# Patient Record
Sex: MALE | Race: OTHER | HISPANIC OR LATINO | URBAN - METROPOLITAN AREA
[De-identification: names, ages, dates, MRNs, and addresses within clinical notes are randomized per-mention and may not be internally consistent; named-entity substitution may affect disease eponyms.]

---

## 2017-05-22 ENCOUNTER — INPATIENT (INPATIENT)
Facility: HOSPITAL | Age: 32
LOS: 1 days | Discharge: HOME | DRG: 195 | End: 2017-05-24
Attending: INTERNAL MEDICINE | Admitting: INTERNAL MEDICINE
Payer: MEDICAID

## 2017-05-22 VITALS
WEIGHT: 139.99 LBS | OXYGEN SATURATION: 100 % | RESPIRATION RATE: 20 BRPM | HEART RATE: 140 BPM | DIASTOLIC BLOOD PRESSURE: 94 MMHG | HEIGHT: 70 IN | TEMPERATURE: 97 F | SYSTOLIC BLOOD PRESSURE: 130 MMHG

## 2017-05-22 DIAGNOSIS — J15.9 UNSPECIFIED BACTERIAL PNEUMONIA: ICD-10-CM

## 2017-05-22 DIAGNOSIS — R10.32 LEFT LOWER QUADRANT PAIN: ICD-10-CM

## 2017-05-22 DIAGNOSIS — J18.9 PNEUMONIA, UNSPECIFIED ORGANISM: ICD-10-CM

## 2017-05-22 DIAGNOSIS — E10.9 TYPE 1 DIABETES MELLITUS WITHOUT COMPLICATIONS: ICD-10-CM

## 2017-05-22 LAB
ALBUMIN SERPL ELPH-MCNC: 4.3 G/DL — SIGNIFICANT CHANGE UP (ref 3.3–5.2)
ALP SERPL-CCNC: 109 U/L — SIGNIFICANT CHANGE UP (ref 40–120)
ALT FLD-CCNC: 11 U/L — SIGNIFICANT CHANGE UP
ANION GAP SERPL CALC-SCNC: 14 MMOL/L — SIGNIFICANT CHANGE UP (ref 5–17)
ANION GAP SERPL CALC-SCNC: 18 MMOL/L — HIGH (ref 5–17)
AST SERPL-CCNC: 11 U/L — SIGNIFICANT CHANGE UP
BASOPHILS # BLD AUTO: 0 K/UL — SIGNIFICANT CHANGE UP (ref 0–0.2)
BASOPHILS NFR BLD AUTO: 0.1 % — SIGNIFICANT CHANGE UP (ref 0–2)
BILIRUB SERPL-MCNC: 1.1 MG/DL — SIGNIFICANT CHANGE UP (ref 0.4–2)
BUN SERPL-MCNC: 12 MG/DL — SIGNIFICANT CHANGE UP (ref 8–20)
BUN SERPL-MCNC: 14 MG/DL — SIGNIFICANT CHANGE UP (ref 8–20)
CALCIUM SERPL-MCNC: 9.2 MG/DL — SIGNIFICANT CHANGE UP (ref 8.6–10.2)
CALCIUM SERPL-MCNC: 9.8 MG/DL — SIGNIFICANT CHANGE UP (ref 8.6–10.2)
CHLORIDE SERPL-SCNC: 92 MMOL/L — LOW (ref 98–107)
CHLORIDE SERPL-SCNC: 94 MMOL/L — LOW (ref 98–107)
CO2 SERPL-SCNC: 22 MMOL/L — SIGNIFICANT CHANGE UP (ref 22–29)
CO2 SERPL-SCNC: 24 MMOL/L — SIGNIFICANT CHANGE UP (ref 22–29)
CREAT SERPL-MCNC: 0.42 MG/DL — LOW (ref 0.5–1.3)
CREAT SERPL-MCNC: 0.48 MG/DL — LOW (ref 0.5–1.3)
D DIMER BLD IA.RAPID-MCNC: 174 NG/ML DDU — SIGNIFICANT CHANGE UP
EOSINOPHIL # BLD AUTO: 0 K/UL — SIGNIFICANT CHANGE UP (ref 0–0.5)
EOSINOPHIL NFR BLD AUTO: 0.1 % — SIGNIFICANT CHANGE UP (ref 0–5)
GLUCOSE SERPL-MCNC: 301 MG/DL — HIGH (ref 70–115)
GLUCOSE SERPL-MCNC: 406 MG/DL — HIGH (ref 70–115)
HCT VFR BLD CALC: 35.5 % — LOW (ref 42–52)
HGB BLD-MCNC: 12.8 G/DL — LOW (ref 14–18)
LACTATE BLDV-MCNC: 1 MMOL/L — SIGNIFICANT CHANGE UP (ref 0.7–2)
LIDOCAIN IGE QN: 14 U/L — LOW (ref 22–51)
LYMPHOCYTES # BLD AUTO: 0.8 K/UL — LOW (ref 1–4.8)
LYMPHOCYTES # BLD AUTO: 8 % — LOW (ref 20–55)
MCHC RBC-ENTMCNC: 30.1 PG — SIGNIFICANT CHANGE UP (ref 27–31)
MCHC RBC-ENTMCNC: 36.1 G/DL — HIGH (ref 32–36)
MCV RBC AUTO: 83.5 FL — SIGNIFICANT CHANGE UP (ref 80–94)
MONOCYTES # BLD AUTO: 1 K/UL — HIGH (ref 0–0.8)
MONOCYTES NFR BLD AUTO: 10 % — SIGNIFICANT CHANGE UP (ref 3–10)
NEUTROPHILS # BLD AUTO: 8.8 K/UL — HIGH (ref 1.8–8)
NEUTROPHILS NFR BLD AUTO: 80 % — HIGH (ref 37–73)
PLATELET # BLD AUTO: 153 K/UL — SIGNIFICANT CHANGE UP (ref 150–400)
POTASSIUM SERPL-MCNC: 4.2 MMOL/L — SIGNIFICANT CHANGE UP (ref 3.5–5.3)
POTASSIUM SERPL-MCNC: 4.6 MMOL/L — SIGNIFICANT CHANGE UP (ref 3.5–5.3)
POTASSIUM SERPL-SCNC: 4.2 MMOL/L — SIGNIFICANT CHANGE UP (ref 3.5–5.3)
POTASSIUM SERPL-SCNC: 4.6 MMOL/L — SIGNIFICANT CHANGE UP (ref 3.5–5.3)
PROT SERPL-MCNC: 8.2 G/DL — SIGNIFICANT CHANGE UP (ref 6.6–8.7)
RAPID RVP RESULT: SIGNIFICANT CHANGE UP
RBC # BLD: 4.25 M/UL — LOW (ref 4.6–6.2)
RBC # FLD: 11.7 % — SIGNIFICANT CHANGE UP (ref 11–15.6)
SODIUM SERPL-SCNC: 132 MMOL/L — LOW (ref 135–145)
SODIUM SERPL-SCNC: 132 MMOL/L — LOW (ref 135–145)
WBC # BLD: 10.7 K/UL — SIGNIFICANT CHANGE UP (ref 4.8–10.8)
WBC # FLD AUTO: 10.7 K/UL — SIGNIFICANT CHANGE UP (ref 4.8–10.8)

## 2017-05-22 PROCEDURE — 74177 CT ABD & PELVIS W/CONTRAST: CPT | Mod: 26

## 2017-05-22 PROCEDURE — 71020: CPT | Mod: 26

## 2017-05-22 PROCEDURE — 72070 X-RAY EXAM THORAC SPINE 2VWS: CPT | Mod: 26

## 2017-05-22 PROCEDURE — 99285 EMERGENCY DEPT VISIT HI MDM: CPT | Mod: 25

## 2017-05-22 PROCEDURE — 93010 ELECTROCARDIOGRAM REPORT: CPT

## 2017-05-22 RX ORDER — SODIUM CHLORIDE 9 MG/ML
500 INJECTION INTRAMUSCULAR; INTRAVENOUS; SUBCUTANEOUS
Qty: 0 | Refills: 0 | Status: DISCONTINUED | OUTPATIENT
Start: 2017-05-22 | End: 2017-05-22

## 2017-05-22 RX ORDER — CEFTRIAXONE 500 MG/1
1 INJECTION, POWDER, FOR SOLUTION INTRAMUSCULAR; INTRAVENOUS ONCE
Qty: 0 | Refills: 0 | Status: COMPLETED | OUTPATIENT
Start: 2017-05-22 | End: 2017-05-22

## 2017-05-22 RX ORDER — DEXTROSE 50 % IN WATER 50 %
25 SYRINGE (ML) INTRAVENOUS ONCE
Qty: 0 | Refills: 0 | Status: DISCONTINUED | OUTPATIENT
Start: 2017-05-22 | End: 2017-05-24

## 2017-05-22 RX ORDER — KETOROLAC TROMETHAMINE 30 MG/ML
15 SYRINGE (ML) INJECTION ONCE
Qty: 0 | Refills: 0 | Status: DISCONTINUED | OUTPATIENT
Start: 2017-05-22 | End: 2017-05-22

## 2017-05-22 RX ORDER — KETOROLAC TROMETHAMINE 30 MG/ML
30 SYRINGE (ML) INJECTION EVERY 6 HOURS
Qty: 0 | Refills: 0 | Status: DISCONTINUED | OUTPATIENT
Start: 2017-05-22 | End: 2017-05-23

## 2017-05-22 RX ORDER — SODIUM CHLORIDE 9 MG/ML
3 INJECTION INTRAMUSCULAR; INTRAVENOUS; SUBCUTANEOUS ONCE
Qty: 0 | Refills: 0 | Status: COMPLETED | OUTPATIENT
Start: 2017-05-22 | End: 2017-05-22

## 2017-05-22 RX ORDER — DEXTROSE 50 % IN WATER 50 %
1 SYRINGE (ML) INTRAVENOUS ONCE
Qty: 0 | Refills: 0 | Status: DISCONTINUED | OUTPATIENT
Start: 2017-05-22 | End: 2017-05-24

## 2017-05-22 RX ORDER — INSULIN LISPRO 100/ML
VIAL (ML) SUBCUTANEOUS
Qty: 0 | Refills: 0 | Status: DISCONTINUED | OUTPATIENT
Start: 2017-05-22 | End: 2017-05-23

## 2017-05-22 RX ORDER — SODIUM CHLORIDE 9 MG/ML
1000 INJECTION, SOLUTION INTRAVENOUS
Qty: 0 | Refills: 0 | Status: DISCONTINUED | OUTPATIENT
Start: 2017-05-22 | End: 2017-05-23

## 2017-05-22 RX ORDER — HUMAN INSULIN 100 [IU]/ML
25 INJECTION, SUSPENSION SUBCUTANEOUS
Qty: 0 | Refills: 0 | Status: DISCONTINUED | OUTPATIENT
Start: 2017-05-22 | End: 2017-05-24

## 2017-05-22 RX ORDER — IPRATROPIUM/ALBUTEROL SULFATE 18-103MCG
3 AEROSOL WITH ADAPTER (GRAM) INHALATION EVERY 6 HOURS
Qty: 0 | Refills: 0 | Status: DISCONTINUED | OUTPATIENT
Start: 2017-05-22 | End: 2017-05-24

## 2017-05-22 RX ORDER — GLUCAGON INJECTION, SOLUTION 0.5 MG/.1ML
1 INJECTION, SOLUTION SUBCUTANEOUS ONCE
Qty: 0 | Refills: 0 | Status: DISCONTINUED | OUTPATIENT
Start: 2017-05-22 | End: 2017-05-24

## 2017-05-22 RX ORDER — ACETAMINOPHEN 500 MG
650 TABLET ORAL EVERY 6 HOURS
Qty: 0 | Refills: 0 | Status: DISCONTINUED | OUTPATIENT
Start: 2017-05-22 | End: 2017-05-24

## 2017-05-22 RX ORDER — PANTOPRAZOLE SODIUM 20 MG/1
40 TABLET, DELAYED RELEASE ORAL
Qty: 0 | Refills: 0 | Status: DISCONTINUED | OUTPATIENT
Start: 2017-05-22 | End: 2017-05-24

## 2017-05-22 RX ORDER — SODIUM CHLORIDE 9 MG/ML
1000 INJECTION, SOLUTION INTRAVENOUS
Qty: 0 | Refills: 0 | Status: DISCONTINUED | OUTPATIENT
Start: 2017-05-22 | End: 2017-05-24

## 2017-05-22 RX ORDER — AZITHROMYCIN 500 MG/1
500 TABLET, FILM COATED ORAL ONCE
Qty: 0 | Refills: 0 | Status: COMPLETED | OUTPATIENT
Start: 2017-05-22 | End: 2017-05-22

## 2017-05-22 RX ORDER — ENOXAPARIN SODIUM 100 MG/ML
40 INJECTION SUBCUTANEOUS EVERY 24 HOURS
Qty: 0 | Refills: 0 | Status: DISCONTINUED | OUTPATIENT
Start: 2017-05-22 | End: 2017-05-24

## 2017-05-22 RX ORDER — AZITHROMYCIN 500 MG/1
500 TABLET, FILM COATED ORAL EVERY 24 HOURS
Qty: 0 | Refills: 0 | Status: DISCONTINUED | OUTPATIENT
Start: 2017-05-22 | End: 2017-05-24

## 2017-05-22 RX ORDER — DEXTROSE 50 % IN WATER 50 %
12.5 SYRINGE (ML) INTRAVENOUS ONCE
Qty: 0 | Refills: 0 | Status: DISCONTINUED | OUTPATIENT
Start: 2017-05-22 | End: 2017-05-24

## 2017-05-22 RX ORDER — SODIUM CHLORIDE 9 MG/ML
500 INJECTION INTRAMUSCULAR; INTRAVENOUS; SUBCUTANEOUS
Qty: 0 | Refills: 0 | Status: COMPLETED | OUTPATIENT
Start: 2017-05-22 | End: 2017-05-22

## 2017-05-22 RX ORDER — CEFTRIAXONE 500 MG/1
1 INJECTION, POWDER, FOR SOLUTION INTRAMUSCULAR; INTRAVENOUS EVERY 24 HOURS
Qty: 0 | Refills: 0 | Status: DISCONTINUED | OUTPATIENT
Start: 2017-05-22 | End: 2017-05-24

## 2017-05-22 RX ORDER — POLYETHYLENE GLYCOL 3350 17 G/17G
17 POWDER, FOR SOLUTION ORAL DAILY
Qty: 0 | Refills: 0 | Status: DISCONTINUED | OUTPATIENT
Start: 2017-05-22 | End: 2017-05-24

## 2017-05-22 RX ADMIN — Medication 30 MILLIGRAM(S): at 17:00

## 2017-05-22 RX ADMIN — SODIUM CHLORIDE 2000 MILLILITER(S): 9 INJECTION INTRAMUSCULAR; INTRAVENOUS; SUBCUTANEOUS at 06:52

## 2017-05-22 RX ADMIN — Medication 15 MILLIGRAM(S): at 06:51

## 2017-05-22 RX ADMIN — SODIUM CHLORIDE 2000 MILLILITER(S): 9 INJECTION INTRAMUSCULAR; INTRAVENOUS; SUBCUTANEOUS at 08:36

## 2017-05-22 RX ADMIN — SODIUM CHLORIDE 2000 MILLILITER(S): 9 INJECTION INTRAMUSCULAR; INTRAVENOUS; SUBCUTANEOUS at 06:00

## 2017-05-22 RX ADMIN — Medication 3 MILLILITER(S): at 15:48

## 2017-05-22 RX ADMIN — CEFTRIAXONE 100 GRAM(S): 500 INJECTION, POWDER, FOR SOLUTION INTRAMUSCULAR; INTRAVENOUS at 22:10

## 2017-05-22 RX ADMIN — CEFTRIAXONE 100 GRAM(S): 500 INJECTION, POWDER, FOR SOLUTION INTRAMUSCULAR; INTRAVENOUS at 08:37

## 2017-05-22 RX ADMIN — Medication 650 MILLIGRAM(S): at 14:13

## 2017-05-22 RX ADMIN — Medication 30 MILLIGRAM(S): at 16:17

## 2017-05-22 RX ADMIN — SODIUM CHLORIDE 3 MILLILITER(S): 9 INJECTION INTRAMUSCULAR; INTRAVENOUS; SUBCUTANEOUS at 06:05

## 2017-05-22 RX ADMIN — Medication 15 MILLIGRAM(S): at 06:40

## 2017-05-22 RX ADMIN — AZITHROMYCIN 255 MILLIGRAM(S): 500 TABLET, FILM COATED ORAL at 09:15

## 2017-05-22 RX ADMIN — ENOXAPARIN SODIUM 40 MILLIGRAM(S): 100 INJECTION SUBCUTANEOUS at 09:32

## 2017-05-22 RX ADMIN — Medication: at 13:16

## 2017-05-22 RX ADMIN — SODIUM CHLORIDE 125 MILLILITER(S): 9 INJECTION, SOLUTION INTRAVENOUS at 19:56

## 2017-05-22 RX ADMIN — Medication 3 MILLILITER(S): at 20:12

## 2017-05-22 RX ADMIN — SODIUM CHLORIDE 125 MILLILITER(S): 9 INJECTION, SOLUTION INTRAVENOUS at 22:10

## 2017-05-22 RX ADMIN — SODIUM CHLORIDE 2000 MILLILITER(S): 9 INJECTION INTRAMUSCULAR; INTRAVENOUS; SUBCUTANEOUS at 05:30

## 2017-05-22 RX ADMIN — HUMAN INSULIN 25 UNIT(S): 100 INJECTION, SUSPENSION SUBCUTANEOUS at 20:40

## 2017-05-22 RX ADMIN — Medication: at 19:09

## 2017-05-22 RX ADMIN — AZITHROMYCIN 255 MILLIGRAM(S): 500 TABLET, FILM COATED ORAL at 22:10

## 2017-05-22 RX ADMIN — Medication 650 MILLIGRAM(S): at 15:11

## 2017-05-22 NOTE — H&P ADULT - PROBLEM SELECTOR PLAN 1
faint infiltrate RML on lateral CXR  ceftriaxone/azithromycin. duonebs  consider short trial of steroids  f/u blood cultures.

## 2017-05-22 NOTE — ED ADULT NURSE REASSESSMENT NOTE - NS ED NURSE REASSESS COMMENT FT1
Call placed to CT regarding patient's return to ED. Pt awaiting transport back to ED.
Spoke with Rosmery from radiology and made aware, pt started drinking PO contrast.
Pt remained safe and hemodynamically stable, pending results of CT scan, pending tx to floor, RN will continue to monitor
Pt a&ox4, calm, cooperative, no signs of distress.  Refer to flowsheet, labs and test results.  Pending Tx to floor.

## 2017-05-22 NOTE — H&P ADULT - HISTORY OF PRESENT ILLNESS
31 yo M w/ hx DM1 presents for upper body pain and cough with chills/sweats. no recent travel, no sick contacts.  no chest pain.     also complaining of LLQ abdominal pain x 1-2 weeks, intermittent, no nausea/vomiting and no diarrhea. + constipation at times.

## 2017-05-22 NOTE — ED PROVIDER NOTE - PROGRESS NOTE DETAILS
Temp is as noted. Will assess for sepsis. Patient signed out to Dr. Hernandez pnd labs and CXR. cxr reviewed and pos pneumonia and will admit Dr Patton called for admission

## 2017-05-22 NOTE — ED PROVIDER NOTE - OBJECTIVE STATEMENT
33 yo diabetic male presents for evaluation of persistent left mid-back pain for the past couple of hours. Patient states the for  the past couple of weeks has suffered with fatigue, chills, and episodes of dyspnea. He states he was treated for DKA several weeks ago; stayed in the hospital for 4 days. He states he was doing generally ok. He girlfriend provided him with a couple of massaged. However this afternoon and noticed pain after she sat on his back to aide with loosen the muscle. Due to the episodes of dyspnea and the back pain he came to the ED to be evaluated. He has not taken any medication for pain.    PMD: None

## 2017-05-22 NOTE — H&P ADULT - ASSESSMENT
31 yo M w/ hx DM1 presents with worsening cough/upper chest discomfort and chills, also with LLQ pain.

## 2017-05-22 NOTE — ED ADULT TRIAGE NOTE - CHIEF COMPLAINT QUOTE
Pt c/o left flank pain w radiation to abdomen since approx 2300 Sunday night.  Denies urinary symptoms.

## 2017-05-22 NOTE — ED PROVIDER NOTE - CHPI ED SYMPTOMS NEG
no tingling/no anorexia/no bowel dysfunction/no fatigue/no difficulty bearing weight/no motor function loss/no neck tenderness/no bladder dysfunction/no numbness/no constipation

## 2017-05-23 DIAGNOSIS — R91.8 OTHER NONSPECIFIC ABNORMAL FINDING OF LUNG FIELD: ICD-10-CM

## 2017-05-23 LAB
ANION GAP SERPL CALC-SCNC: 13 MMOL/L — SIGNIFICANT CHANGE UP (ref 5–17)
APPEARANCE UR: CLEAR — SIGNIFICANT CHANGE UP
BASOPHILS # BLD AUTO: 0 K/UL — SIGNIFICANT CHANGE UP (ref 0–0.2)
BASOPHILS NFR BLD AUTO: 0.2 % — SIGNIFICANT CHANGE UP (ref 0–2)
BILIRUB UR-MCNC: NEGATIVE — SIGNIFICANT CHANGE UP
BUN SERPL-MCNC: 6 MG/DL — LOW (ref 8–20)
CALCIUM SERPL-MCNC: 9.4 MG/DL — SIGNIFICANT CHANGE UP (ref 8.6–10.2)
CHLORIDE SERPL-SCNC: 97 MMOL/L — LOW (ref 98–107)
CO2 SERPL-SCNC: 26 MMOL/L — SIGNIFICANT CHANGE UP (ref 22–29)
COLOR SPEC: YELLOW — SIGNIFICANT CHANGE UP
CREAT SERPL-MCNC: 0.37 MG/DL — LOW (ref 0.5–1.3)
DIFF PNL FLD: NEGATIVE — SIGNIFICANT CHANGE UP
EOSINOPHIL # BLD AUTO: 0.1 K/UL — SIGNIFICANT CHANGE UP (ref 0–0.5)
EOSINOPHIL NFR BLD AUTO: 2 % — SIGNIFICANT CHANGE UP (ref 0–5)
GLUCOSE SERPL-MCNC: 197 MG/DL — HIGH (ref 70–115)
GLUCOSE UR QL: 250 MG/DL
HBA1C BLD-MCNC: 8.3 % — HIGH (ref 4–5.6)
HCT VFR BLD CALC: 31.9 % — LOW (ref 42–52)
HGB BLD-MCNC: 11.4 G/DL — LOW (ref 14–18)
KETONES UR-MCNC: ABNORMAL
LEUKOCYTE ESTERASE UR-ACNC: NEGATIVE — SIGNIFICANT CHANGE UP
LYMPHOCYTES # BLD AUTO: 1 K/UL — SIGNIFICANT CHANGE UP (ref 1–4.8)
LYMPHOCYTES # BLD AUTO: 25.2 % — SIGNIFICANT CHANGE UP (ref 20–55)
MCHC RBC-ENTMCNC: 29.5 PG — SIGNIFICANT CHANGE UP (ref 27–31)
MCHC RBC-ENTMCNC: 35.7 G/DL — SIGNIFICANT CHANGE UP (ref 32–36)
MCV RBC AUTO: 82.6 FL — SIGNIFICANT CHANGE UP (ref 80–94)
MONOCYTES # BLD AUTO: 0.4 K/UL — SIGNIFICANT CHANGE UP (ref 0–0.8)
MONOCYTES NFR BLD AUTO: 8.8 % — SIGNIFICANT CHANGE UP (ref 3–10)
NEUTROPHILS # BLD AUTO: 2.6 K/UL — SIGNIFICANT CHANGE UP (ref 1.8–8)
NEUTROPHILS NFR BLD AUTO: 63.6 % — SIGNIFICANT CHANGE UP (ref 37–73)
NITRITE UR-MCNC: NEGATIVE — SIGNIFICANT CHANGE UP
PH UR: 7 — SIGNIFICANT CHANGE UP (ref 5–8)
PLATELET # BLD AUTO: 146 K/UL — LOW (ref 150–400)
POTASSIUM SERPL-MCNC: 4 MMOL/L — SIGNIFICANT CHANGE UP (ref 3.5–5.3)
POTASSIUM SERPL-SCNC: 4 MMOL/L — SIGNIFICANT CHANGE UP (ref 3.5–5.3)
PROT UR-MCNC: NEGATIVE MG/DL — SIGNIFICANT CHANGE UP
RBC # BLD: 3.86 M/UL — LOW (ref 4.6–6.2)
RBC # FLD: 11.5 % — SIGNIFICANT CHANGE UP (ref 11–15.6)
SODIUM SERPL-SCNC: 136 MMOL/L — SIGNIFICANT CHANGE UP (ref 135–145)
SP GR SPEC: 1 — LOW (ref 1.01–1.02)
UROBILINOGEN FLD QL: 4 MG/DL
WBC # BLD: 4.1 K/UL — LOW (ref 4.8–10.8)
WBC # FLD AUTO: 4.1 K/UL — LOW (ref 4.8–10.8)

## 2017-05-23 PROCEDURE — 99233 SBSQ HOSP IP/OBS HIGH 50: CPT

## 2017-05-23 RX ORDER — INSULIN LISPRO 100/ML
8 VIAL (ML) SUBCUTANEOUS ONCE
Qty: 0 | Refills: 0 | Status: COMPLETED | OUTPATIENT
Start: 2017-05-23 | End: 2017-05-23

## 2017-05-23 RX ORDER — IBUPROFEN 200 MG
600 TABLET ORAL
Qty: 0 | Refills: 0 | Status: DISCONTINUED | OUTPATIENT
Start: 2017-05-23 | End: 2017-05-24

## 2017-05-23 RX ORDER — INSULIN LISPRO 100/ML
VIAL (ML) SUBCUTANEOUS
Qty: 0 | Refills: 0 | Status: DISCONTINUED | OUTPATIENT
Start: 2017-05-23 | End: 2017-05-24

## 2017-05-23 RX ADMIN — Medication 650 MILLIGRAM(S): at 17:01

## 2017-05-23 RX ADMIN — Medication 600 MILLIGRAM(S): at 15:00

## 2017-05-23 RX ADMIN — POLYETHYLENE GLYCOL 3350 17 GRAM(S): 17 POWDER, FOR SOLUTION ORAL at 08:32

## 2017-05-23 RX ADMIN — Medication 3 MILLILITER(S): at 15:45

## 2017-05-23 RX ADMIN — CEFTRIAXONE 100 GRAM(S): 500 INJECTION, POWDER, FOR SOLUTION INTRAMUSCULAR; INTRAVENOUS at 22:07

## 2017-05-23 RX ADMIN — Medication 30 MILLIGRAM(S): at 08:45

## 2017-05-23 RX ADMIN — HUMAN INSULIN 25 UNIT(S): 100 INJECTION, SUSPENSION SUBCUTANEOUS at 17:28

## 2017-05-23 RX ADMIN — Medication 30 MILLIGRAM(S): at 01:20

## 2017-05-23 RX ADMIN — Medication 30 MILLIGRAM(S): at 08:30

## 2017-05-23 RX ADMIN — PANTOPRAZOLE SODIUM 40 MILLIGRAM(S): 20 TABLET, DELAYED RELEASE ORAL at 06:09

## 2017-05-23 RX ADMIN — Medication 600 MILLIGRAM(S): at 14:22

## 2017-05-23 RX ADMIN — Medication 600 MILLIGRAM(S): at 21:25

## 2017-05-23 RX ADMIN — Medication 3 MILLILITER(S): at 08:55

## 2017-05-23 RX ADMIN — Medication 2: at 08:37

## 2017-05-23 RX ADMIN — Medication 2: at 12:25

## 2017-05-23 RX ADMIN — Medication 8 UNIT(S): at 23:28

## 2017-05-23 RX ADMIN — Medication 3 MILLILITER(S): at 20:25

## 2017-05-23 RX ADMIN — AZITHROMYCIN 255 MILLIGRAM(S): 500 TABLET, FILM COATED ORAL at 23:29

## 2017-05-23 RX ADMIN — Medication 30 MILLIGRAM(S): at 02:00

## 2017-05-23 RX ADMIN — Medication 600 MILLIGRAM(S): at 20:25

## 2017-05-23 RX ADMIN — Medication 650 MILLIGRAM(S): at 18:01

## 2017-05-23 RX ADMIN — HUMAN INSULIN 25 UNIT(S): 100 INJECTION, SUSPENSION SUBCUTANEOUS at 08:38

## 2017-05-23 RX ADMIN — Medication 650 MILLIGRAM(S): at 23:37

## 2017-05-23 NOTE — PROGRESS NOTE ADULT - ASSESSMENT
33 yo M w/ hx DM1 presents with worsening cough/upper chest discomfort and chills, also with LLQ pain.   treating for PNA and constipation.  Lung mass : outpatient repeat CT in 2-3 weeks (after discussion with radiology Dr Hendricks)

## 2017-05-23 NOTE — PROGRESS NOTE ADULT - SUBJECTIVE AND OBJECTIVE BOX
seen for PNEUMONIA    complaining of diffuse body aches.  nebs helping.  no cough.  ROS otherwise negative.     MEDICATIONS  (STANDING):  enoxaparin Injectable 40milliGRAM(s) SubCutaneous every 24 hours  insulin lispro (HumaLOG) corrective regimen sliding scale  SubCutaneous three times a day before meals  dextrose 5%. 1000milliLiter(s) IV Continuous <Continuous>  dextrose 50% Injectable 12.5Gram(s) IV Push once  dextrose 50% Injectable 25Gram(s) IV Push once  dextrose 50% Injectable 25Gram(s) IV Push once  multiple electrolytes Injection Type 1 1000milliLiter(s) IV Continuous <Continuous>  insulin NPH human recombinant 25Unit(s) SubCutaneous two times a day before meals  ALBUTerol/ipratropium for Nebulization 3milliLiter(s) Nebulizer every 6 hours  azithromycin  IVPB 500milliGRAM(s) IV Intermittent every 24 hours  cefTRIAXone   IVPB 1Gram(s) IV Intermittent every 24 hours  pantoprazole    Tablet 40milliGRAM(s) Oral before breakfast  polyethylene glycol 3350 17Gram(s) Oral daily    MEDICATIONS  (PRN):  dextrose Gel 1Dose(s) Oral once PRN Blood Glucose LESS THAN 70 milliGRAM(s)/deciliter  glucagon  Injectable 1milliGRAM(s) IntraMuscular once PRN Glucose LESS THAN 70 milligrams/deciliter  acetaminophen   Tablet 650milliGRAM(s) Oral every 6 hours PRN For Temp greater than 38 C (100.4 F)  acetaminophen   Tablet. 650milliGRAM(s) Oral every 6 hours PRN Mild Pain (1 - 3)  ibuprofen  Tablet 600milliGRAM(s) Oral four times a day PRN moderate pain      Allergies    No Known Allergies    Vital Signs Last 24 Hrs  T(C): 37.2, Max: 37.2 (05-23 @ 08:36)  T(F): 98.9, Max: 98.9 (05-23 @ 08:36)  HR: 97 (97 - 117)  BP: 145/88 (136/88 - 157/102)  BP(mean): --  RR: 18 (18 - 18)  SpO2: 99% (97% - 100%)    PHYSICAL EXAM:    GENERAL: NAD   CHEST/LUNG: Clear to percussion bilaterally  HEART: Regular rate and rhythm; S1 S2; no murmurs noted  ABDOMEN: Soft, Nontender, Nondistended; Bowel sounds present  EXTREMITIES: no edema.  NERVOUS SYSTEM:  Alert & Oriented X3, Good concentration; Motor Strength 5/5 B/L upper and lower extremities  PSYCH: normal mood, appropriate response.    LABS:                        12.8   10.7  )-----------( 153      ( 22 May 2017 06:15 )             35.5     05    136  |  97<L>  |  6.0<L>  ----------------------------<  197<H>  4.0   |  26.0  |  0.37<L>    Ca    9.4      23 May 2017 09:08    TPro  8.2  /  Alb  4.3  /  TBili  1.1  /  DBili  x   /  AST  11  /  ALT  11  /  AlkPhos  109  -      Urinalysis Basic - ( 23 May 2017 06:11 )    Color: Yellow / Appearance: Clear / S.005 / pH: x  Gluc: x / Ketone: Trace  / Bili: Negative / Urobili: 4 mg/dL   Blood: x / Protein: Negative mg/dL / Nitrite: Negative   Leuk Esterase: Negative / RBC: x / WBC x   Sq Epi: x / Non Sq Epi: x / Bacteria: x        CAPILLARY BLOOD GLUCOSE  200 (23 May 2017 08:50)  213 (23 May 2017 06:04)        RADIOLOGY & ADDITIONAL TESTS:

## 2017-05-23 NOTE — PROGRESS NOTE ADULT - PROBLEM SELECTOR PLAN 4
focal pneumonia vs malignancy  d/w radiology, repeat CT chest non contrast in 2-3 weeks to evaluate for resolution

## 2017-05-24 VITALS
DIASTOLIC BLOOD PRESSURE: 80 MMHG | OXYGEN SATURATION: 100 % | HEART RATE: 99 BPM | TEMPERATURE: 98 F | SYSTOLIC BLOOD PRESSURE: 135 MMHG | RESPIRATION RATE: 18 BRPM

## 2017-05-24 LAB
ANION GAP SERPL CALC-SCNC: 14 MMOL/L — SIGNIFICANT CHANGE UP (ref 5–17)
BUN SERPL-MCNC: 8 MG/DL — SIGNIFICANT CHANGE UP (ref 8–20)
CALCIUM SERPL-MCNC: 9.8 MG/DL — SIGNIFICANT CHANGE UP (ref 8.6–10.2)
CHLORIDE SERPL-SCNC: 98 MMOL/L — SIGNIFICANT CHANGE UP (ref 98–107)
CO2 SERPL-SCNC: 25 MMOL/L — SIGNIFICANT CHANGE UP (ref 22–29)
CREAT SERPL-MCNC: 0.48 MG/DL — LOW (ref 0.5–1.3)
CULTURE RESULTS: NO GROWTH — SIGNIFICANT CHANGE UP
GLUCOSE SERPL-MCNC: 140 MG/DL — HIGH (ref 70–115)
HCT VFR BLD CALC: 32.1 % — LOW (ref 42–52)
HGB BLD-MCNC: 11.3 G/DL — LOW (ref 14–18)
MCHC RBC-ENTMCNC: 29.3 PG — SIGNIFICANT CHANGE UP (ref 27–31)
MCHC RBC-ENTMCNC: 35.2 G/DL — SIGNIFICANT CHANGE UP (ref 32–36)
MCV RBC AUTO: 83.2 FL — SIGNIFICANT CHANGE UP (ref 80–94)
PLATELET # BLD AUTO: 200 K/UL — SIGNIFICANT CHANGE UP (ref 150–400)
POTASSIUM SERPL-MCNC: 3.9 MMOL/L — SIGNIFICANT CHANGE UP (ref 3.5–5.3)
POTASSIUM SERPL-SCNC: 3.9 MMOL/L — SIGNIFICANT CHANGE UP (ref 3.5–5.3)
RBC # BLD: 3.86 M/UL — LOW (ref 4.6–6.2)
RBC # FLD: 11.2 % — SIGNIFICANT CHANGE UP (ref 11–15.6)
SODIUM SERPL-SCNC: 137 MMOL/L — SIGNIFICANT CHANGE UP (ref 135–145)
SPECIMEN SOURCE: SIGNIFICANT CHANGE UP
WBC # BLD: 3.3 K/UL — LOW (ref 4.8–10.8)
WBC # FLD AUTO: 3.3 K/UL — LOW (ref 4.8–10.8)

## 2017-05-24 PROCEDURE — 36415 COLL VENOUS BLD VENIPUNCTURE: CPT

## 2017-05-24 PROCEDURE — 85379 FIBRIN DEGRADATION QUANT: CPT

## 2017-05-24 PROCEDURE — 85027 COMPLETE CBC AUTOMATED: CPT

## 2017-05-24 PROCEDURE — 94760 N-INVAS EAR/PLS OXIMETRY 1: CPT

## 2017-05-24 PROCEDURE — 81003 URINALYSIS AUTO W/O SCOPE: CPT

## 2017-05-24 PROCEDURE — 99285 EMERGENCY DEPT VISIT HI MDM: CPT | Mod: 25

## 2017-05-24 PROCEDURE — 87086 URINE CULTURE/COLONY COUNT: CPT

## 2017-05-24 PROCEDURE — 87486 CHLMYD PNEUM DNA AMP PROBE: CPT

## 2017-05-24 PROCEDURE — 80053 COMPREHEN METABOLIC PANEL: CPT

## 2017-05-24 PROCEDURE — 87633 RESP VIRUS 12-25 TARGETS: CPT

## 2017-05-24 PROCEDURE — 80048 BASIC METABOLIC PNL TOTAL CA: CPT

## 2017-05-24 PROCEDURE — 94640 AIRWAY INHALATION TREATMENT: CPT

## 2017-05-24 PROCEDURE — 83690 ASSAY OF LIPASE: CPT

## 2017-05-24 PROCEDURE — 99239 HOSP IP/OBS DSCHRG MGMT >30: CPT

## 2017-05-24 PROCEDURE — 87040 BLOOD CULTURE FOR BACTERIA: CPT

## 2017-05-24 PROCEDURE — 72070 X-RAY EXAM THORAC SPINE 2VWS: CPT

## 2017-05-24 PROCEDURE — 87581 M.PNEUMON DNA AMP PROBE: CPT

## 2017-05-24 PROCEDURE — 87798 DETECT AGENT NOS DNA AMP: CPT

## 2017-05-24 PROCEDURE — 83036 HEMOGLOBIN GLYCOSYLATED A1C: CPT

## 2017-05-24 PROCEDURE — 93005 ELECTROCARDIOGRAM TRACING: CPT

## 2017-05-24 PROCEDURE — 71046 X-RAY EXAM CHEST 2 VIEWS: CPT

## 2017-05-24 PROCEDURE — 74177 CT ABD & PELVIS W/CONTRAST: CPT

## 2017-05-24 PROCEDURE — 83605 ASSAY OF LACTIC ACID: CPT

## 2017-05-24 PROCEDURE — 96374 THER/PROPH/DIAG INJ IV PUSH: CPT

## 2017-05-24 RX ORDER — AZITHROMYCIN 500 MG/1
1 TABLET, FILM COATED ORAL
Qty: 3 | Refills: 0
Start: 2017-05-24 | End: 2017-05-27

## 2017-05-24 RX ORDER — IBUPROFEN 200 MG
1 TABLET ORAL
Qty: 0 | Refills: 0 | DISCHARGE
Start: 2017-05-24

## 2017-05-24 RX ORDER — AZITHROMYCIN 500 MG/1
500 TABLET, FILM COATED ORAL ONCE
Qty: 0 | Refills: 0 | Status: DISCONTINUED | OUTPATIENT
Start: 2017-05-24 | End: 2017-05-24

## 2017-05-24 RX ORDER — SACCHAROMYCES BOULARDII 250 MG
500 POWDER IN PACKET (EA) ORAL
Qty: 0 | Refills: 0 | Status: DISCONTINUED | OUTPATIENT
Start: 2017-05-24 | End: 2017-05-24

## 2017-05-24 RX ORDER — OXYCODONE AND ACETAMINOPHEN 5; 325 MG/1; MG/1
1 TABLET ORAL
Qty: 20 | Refills: 0
Start: 2017-05-24 | End: 2017-05-29

## 2017-05-24 RX ORDER — MORPHINE SULFATE 50 MG/1
1 CAPSULE, EXTENDED RELEASE ORAL ONCE
Qty: 0 | Refills: 0 | Status: DISCONTINUED | OUTPATIENT
Start: 2017-05-24 | End: 2017-05-24

## 2017-05-24 RX ORDER — AZITHROMYCIN 500 MG/1
250 TABLET, FILM COATED ORAL DAILY
Qty: 0 | Refills: 0 | Status: DISCONTINUED | OUTPATIENT
Start: 2017-05-25 | End: 2017-05-24

## 2017-05-24 RX ORDER — ALBUTEROL 90 UG/1
2 AEROSOL, METERED ORAL
Qty: 2 | Refills: 0
Start: 2017-05-24 | End: 2017-06-23

## 2017-05-24 RX ADMIN — Medication 2: at 08:31

## 2017-05-24 RX ADMIN — Medication 650 MILLIGRAM(S): at 00:37

## 2017-05-24 RX ADMIN — PANTOPRAZOLE SODIUM 40 MILLIGRAM(S): 20 TABLET, DELAYED RELEASE ORAL at 05:59

## 2017-05-24 RX ADMIN — Medication 3 MILLILITER(S): at 08:15

## 2017-05-24 RX ADMIN — MORPHINE SULFATE 1 MILLIGRAM(S): 50 CAPSULE, EXTENDED RELEASE ORAL at 05:59

## 2017-05-24 RX ADMIN — HUMAN INSULIN 25 UNIT(S): 100 INJECTION, SUSPENSION SUBCUTANEOUS at 08:32

## 2017-05-24 NOTE — DISCHARGE NOTE ADULT - CARE PLAN
Principal Discharge DX:	Pneumonia, bacterial  Goal:	resolution.  Instructions for follow-up, activity and diet:	finish course of antibiotics.  follow up with PMD in 1-2 weeks.  Secondary Diagnosis:	Diabetes mellitus type 1  Instructions for follow-up, activity and diet:	hemoglobin a1c 8  continue home insulin regimen and frequent glucose monitoring.  Secondary Diagnosis:	Lung mass  Instructions for follow-up, activity and diet:	repeat CT chest in 2-3 weeks to evaluate for resolution  prescription provided. Principal Discharge DX:	Pneumonia, bacterial  Goal:	resolution.  Instructions for follow-up, activity and diet:	finish course of antibiotics.  follow up with PMD in 1-2 weeks.  Secondary Diagnosis:	Diabetes mellitus type 1  Goal:	controlled.  episodes of hyperglycemia as inpatient.  Instructions for follow-up, activity and diet:	hemoglobin a1c 8  continue home insulin regimen and frequent glucose monitoring.  Secondary Diagnosis:	Lung mass  Instructions for follow-up, activity and diet:	repeat CT chest in 2-3 weeks to evaluate for resolution  prescription provided.

## 2017-05-24 NOTE — DISCHARGE NOTE ADULT - PATIENT PORTAL LINK FT
“You can access the FollowHealth Patient Portal, offered by [facility], by registering with the following website: [email or web address]”

## 2017-05-24 NOTE — DISCHARGE NOTE ADULT - OTHER SIGNIFICANT FINDINGS
ct abdomen/ pelvis  There is a spiculated a (mediastinal mass in the lower lobe medial   basilar segment concerning for carcinoma without gross bone erosion or   pleural effusion measuring 4.4 x 3.5 cm.   The visualized portions of the heart are normal.    There is no free intra-abdominal air or ascites.    The liver, spleen, pancreas, adrenal glands, gallbladder are normal.    There is no intra or extrahepatic biliary ductal dilatation.    The stomach, duodenum, small and large bowel are within normal limits.   There is a moderate stool load throughout the colon.  Appendix not visualized.  Both kidneys show normal uptake of contrast media without masses or   hydronephrosis.      The urinary bladder shows normal morphology and contour.      Prostate and seminal vesicles within normal limits.    There are no retroperitoneal masses or abnormal lymphadenopathy.  The retroperitoneal vessels are normal.      The bones and soft tissues are normal.

## 2017-05-24 NOTE — DISCHARGE NOTE ADULT - PLAN OF CARE
resolution. finish course of antibiotics.  follow up with PMD in 1-2 weeks. hemoglobin a1c 8  continue home insulin regimen and frequent glucose monitoring. repeat CT chest in 2-3 weeks to evaluate for resolution  prescription provided. controlled.  episodes of hyperglycemia as inpatient.

## 2017-05-24 NOTE — DISCHARGE NOTE ADULT - ADDITIONAL INSTRUCTIONS
if you do not have PMD call [facility] [place] [address]  Medical: [phone]

## 2017-05-24 NOTE — DISCHARGE NOTE ADULT - MEDICATION SUMMARY - MEDICATIONS TO TAKE
I will START or STAY ON the medications listed below when I get home from the hospital:    ibuprofen 600 mg oral tablet  -- 1 tab(s) by mouth 4 times a day, As needed, moderate pain  -- Indication: For Pain    Percocet 5/325 oral tablet  -- 1 tab(s) by mouth 4 times a day MDD:4  -- Caution federal law prohibits the transfer of this drug to any person other  than the person for whom it was prescribed.  May cause drowsiness.  Alcohol may intensify this effect.  Use care when operating dangerous machinery.  This prescription cannot be refilled.  This product contains acetaminophen.  Do not use  with any other product containing acetaminophen to prevent possible liver damage.  Using more of this medication than prescribed may cause serious breathing problems.    -- Indication: For Pain    HumuLIN N 100 units/mL subcutaneous suspension  -- 25 unit(s) subcutaneous 2 times a day  -- Indication: For Diabetes mellitus type 1    albuterol 90 mcg/inh inhalation aerosol  -- 2 puff(s) inhaled every 4 hours, As Needed -for shortness of breath and/or wheezing  -- For inhalation only.  It is very important that you take or use this exactly as directed.  Do not skip doses or discontinue unless directed by your doctor.  Obtain medical advice before taking any non-prescription drugs as some may affect the action of this medication.  Shake well before use.    -- Indication: For shortness of breath    azithromycin 250 mg oral tablet  -- 1 tab(s) by mouth once a day x 3 days  -- Indication: For Pneumonia    amoxicillin-clavulanate 875 mg-125 mg oral tablet  -- 1 tab(s) by mouth 2 times a day  -- Indication: For Pneumonia

## 2017-05-24 NOTE — DISCHARGE NOTE ADULT - HOSPITAL COURSE
31 yo M w/ hx DM1 presents for upper body pain and cough with chills/sweats. no recent travel, no sick contacts.  no chest pain.     also complaining of LLQ abdominal pain x 1-2 weeks, intermittent, no nausea/vomiting and no diarrhea. + constipation at times.      hospital course: patient treated for pneumonia with improvement in symptoms, fevers and leukocytosis.  CT abdomen/pelvis showing moderate constipation and left lower lobe mass. Discussed with radiologist, given clinical context likely focal pneumonia.  Repeat CT chest in 2-3 weeks to monitor for resolution.  patient stable for discharge. d/c planning 35 min.  patient agreeable to plan as outlined.

## 2017-05-27 LAB
CULTURE RESULTS: SIGNIFICANT CHANGE UP
CULTURE RESULTS: SIGNIFICANT CHANGE UP
SPECIMEN SOURCE: SIGNIFICANT CHANGE UP
SPECIMEN SOURCE: SIGNIFICANT CHANGE UP

## 2017-06-06 PROBLEM — E10.9 TYPE 1 DIABETES MELLITUS WITHOUT COMPLICATIONS: Chronic | Status: ACTIVE | Noted: 2017-05-22

## 2017-06-13 ENCOUNTER — APPOINTMENT (OUTPATIENT)
Dept: CT IMAGING | Facility: CLINIC | Age: 32
End: 2017-06-13

## 2017-06-13 ENCOUNTER — OUTPATIENT (OUTPATIENT)
Dept: OUTPATIENT SERVICES | Facility: HOSPITAL | Age: 32
LOS: 1 days | End: 2017-06-13
Payer: MEDICAID

## 2017-06-13 DIAGNOSIS — Z00.8 ENCOUNTER FOR OTHER GENERAL EXAMINATION: ICD-10-CM

## 2017-06-13 PROCEDURE — 71250 CT THORAX DX C-: CPT

## 2017-06-13 PROCEDURE — 71250 CT THORAX DX C-: CPT | Mod: 26

## 2017-11-09 ENCOUNTER — EMERGENCY (EMERGENCY)
Facility: HOSPITAL | Age: 32
LOS: 1 days | Discharge: HOME | End: 2017-11-09
Attending: EMERGENCY MEDICINE
Payer: MEDICAID

## 2017-11-09 VITALS
HEART RATE: 98 BPM | DIASTOLIC BLOOD PRESSURE: 66 MMHG | OXYGEN SATURATION: 99 % | TEMPERATURE: 98 F | SYSTOLIC BLOOD PRESSURE: 100 MMHG

## 2017-11-09 VITALS
RESPIRATION RATE: 20 BRPM | HEIGHT: 70 IN | HEART RATE: 140 BPM | TEMPERATURE: 98 F | OXYGEN SATURATION: 98 % | WEIGHT: 130.07 LBS | SYSTOLIC BLOOD PRESSURE: 110 MMHG | DIASTOLIC BLOOD PRESSURE: 70 MMHG

## 2017-11-09 PROCEDURE — 96372 THER/PROPH/DIAG INJ SC/IM: CPT

## 2017-11-09 PROCEDURE — 93005 ELECTROCARDIOGRAM TRACING: CPT

## 2017-11-09 PROCEDURE — 99283 EMERGENCY DEPT VISIT LOW MDM: CPT | Mod: 25

## 2017-11-09 PROCEDURE — 99284 EMERGENCY DEPT VISIT MOD MDM: CPT

## 2017-11-09 PROCEDURE — 93010 ELECTROCARDIOGRAM REPORT: CPT

## 2017-11-09 RX ORDER — DIPHENHYDRAMINE HCL 50 MG
50 CAPSULE ORAL ONCE
Qty: 0 | Refills: 0 | Status: COMPLETED | OUTPATIENT
Start: 2017-11-09 | End: 2017-11-09

## 2017-11-09 RX ORDER — FAMOTIDINE 10 MG/ML
20 INJECTION INTRAVENOUS DAILY
Qty: 0 | Refills: 0 | Status: DISCONTINUED | OUTPATIENT
Start: 2017-11-09 | End: 2017-11-13

## 2017-11-09 RX ORDER — FAMOTIDINE 10 MG/ML
1 INJECTION INTRAVENOUS
Qty: 14 | Refills: 0
Start: 2017-11-09 | End: 2017-11-16

## 2017-11-09 RX ORDER — DIPHENHYDRAMINE HCL 50 MG
1 CAPSULE ORAL
Qty: 21 | Refills: 0
Start: 2017-11-09 | End: 2017-11-16

## 2017-11-09 RX ORDER — DEXAMETHASONE 0.5 MG/5ML
10 ELIXIR ORAL ONCE
Qty: 0 | Refills: 0 | Status: COMPLETED | OUTPATIENT
Start: 2017-11-09 | End: 2017-11-09

## 2017-11-09 RX ADMIN — FAMOTIDINE 20 MILLIGRAM(S): 10 INJECTION INTRAVENOUS at 12:13

## 2017-11-09 RX ADMIN — Medication 10 MILLIGRAM(S): at 12:17

## 2017-11-09 RX ADMIN — Medication 50 MILLIGRAM(S): at 12:16

## 2017-11-09 NOTE — ED PROVIDER NOTE - OBJECTIVE STATEMENT
Itchy rash since 1100 today: started while watching TV.  Initially on legs but now all over.  Denies tongue or lip swelling, denies diff swallowing, denies SOB. No new foods, soaps, detergents, lotion/ creams/ perfumes noted.  No new medications/ supplements/ vitamins.  No prior allergy problems.  PMD: Nevin

## 2017-11-09 NOTE — ED PROVIDER NOTE - CHPI ED SYMPTOMS NEG
no swelling of face, tongue/no nausea/no difficulty swallowing/no shortness of breath/no difficulty breathing/no vomiting/no wheezing/no throat itching

## 2017-11-09 NOTE — ED PROVIDER NOTE - CARDIAC RATE
-acute on chronic  -c/w Duoneb /O2 supplement  -finished coursed of steroid tapering  -leukocytosis likely from steroids, monitor CBC  -Pulm following.   -overall prognosis poor normal TACHYCARDIC

## 2017-11-09 NOTE — ED PROVIDER NOTE - ATTENDING CONTRIBUTION TO CARE
I, Brett Day, performed the initial face to face bedside interview with this patient regarding history of present illness, review of symptoms and relevant past medical, social and family history.  I completed an independent physical examination.  I was the provider who initially evaluated this patient.  Follow-up on ordered tests (ie labs, radiologic studies) and re-evaluation of the patient's status has been communicated to the ACP.  Disposition of the patient will be based on test outcome and response to ED interventions.

## 2020-07-31 NOTE — PATIENT PROFILE ADULT. - CENTRAL VENOUS CATHETER
COVID Screen    Does Patient OR patient's household members have any of the following:    · Temperature: Fever greater than or equal to 100 F   No   · Respiratory symptoms: New or worsening cough, shortness of breath, or sore throat   No   · GI Symptoms: New onset of nausea, vomiting or diarrhea   No   · Miscellaneous: New onset of loss of taste or smell, chills, repeated shaking with chills, muscle pain or headache   No   · Contact with anyone who has tested positive, suspected of having or pending test for covid?   No       If patient is scheduled for a non virtual appointment (in clinic):  • Patient informed of policy for masking while present for appointments and asked to bring own mask if able to and/or told a mask will be given at arrival to clinic   Yes  • Patient informed of visitor restrictions (if a visitor/chaperone is necessary please list name of person)   Yes  • Patient advised not to arrive more than 20 minutes before appointment time   Yes    
Call to mother, left a message on identified voice mail requesting a return call to complete the covid screen. Also, Dr. Bonner wants to know if a physical is needed. Juan Manuel can establish with Dr. Bonner and be seen for an appointment without having a physical. Juan Manuel can still be seen to address any other issues if needed.   
no

## 2021-01-04 NOTE — PATIENT PROFILE ADULT. - PRO INTERPRETER NEED 2
[facility]  Renetta Khan M.D.  [address]  [place] [address]  Fax: [phone]   Authorization for Release/Disclosure of   Protected Health Information   Name: JUANCHO FREITAS : 1952 SSN: [id number]   Address: [address] [place] [address]   [place] [address] Phone:    [phone] (home)    I authorize the entity listed below to release/disclose the PHI below to:   [facility]/Renetta Khan M.D. and Renetta Khan M.D.   Provider or Entity Name:    [facility]   Address   City, State, Zip   Phone:      Fax:     Reason for request: continuity of care   Information to be released:    [  ] LAST COLONOSCOPY,  including any PATH REPORT and follow-up  [  ] LAST FIT/COLOGUARD RESULT [  ] LAST DEXA  [  ] LAST MAMMOGRAM  [  ] LAST PAP  [  ] LAST LABS [  x] RETINA EXAM REPORT  [  ] IMMUNIZATION RECORDS  [  ] Release all info      [  ] Check here and initial the line next to each item to release ALL health information INCLUDING  _____ Care and treatment for drug and / or alcohol abuse  _____ HIV testing, infection status, or AIDS  _____ Genetic Testing    DATES OF SERVICE OR TIME PERIOD TO BE DISCLOSED: _____________  I understand and acknowledge that:  * This Authorization may be revoked at any time by you in writing, except if your health information has already been used or disclosed.  * Your health information that will be used or disclosed as a result of you signing this authorization could be re-disclosed by the recipient. If this occurs, your re-disclosed health information may no longer be protected by State or Federal laws.  * You may refuse to sign this Authorization. Your refusal will not affect your ability to obtain treatment.  * This Authorization becomes effective upon signing and will  on (date) __________.      If no date is indicated, this Authorization will  one (1) year from the signature date.    Name: Juancho Freitas    Signature:   Date:     2021       PLEASE FAX  REQUESTED RECORDS BACK TO: [phone]   English

## 2021-06-28 NOTE — ED ADULT TRIAGE NOTE - WEIGHT IN KG
How Severe Is Your Skin Lesion?: moderate Have Your Skin Lesions Been Treated?: not been treated Is This A New Presentation, Or A Follow-Up?: Skin Lesion Which Family Member (Optional)?: Aunt grandmother grandfather all paternal 59

## 2022-02-06 ENCOUNTER — EMERGENCY (EMERGENCY)
Facility: HOSPITAL | Age: 37
LOS: 1 days | Discharge: HOME | End: 2022-02-06
Attending: EMERGENCY MEDICINE
Payer: MEDICAID

## 2022-02-06 VITALS
DIASTOLIC BLOOD PRESSURE: 83 MMHG | TEMPERATURE: 100 F | OXYGEN SATURATION: 94 % | HEIGHT: 70 IN | HEART RATE: 94 BPM | RESPIRATION RATE: 18 BRPM | SYSTOLIC BLOOD PRESSURE: 128 MMHG | WEIGHT: 175.05 LBS

## 2022-02-06 LAB
ACETONE SERPL-MCNC: NEGATIVE — SIGNIFICANT CHANGE UP
ALBUMIN SERPL ELPH-MCNC: 4.3 G/DL — SIGNIFICANT CHANGE UP (ref 3.3–5.2)
ALP SERPL-CCNC: 94 U/L — SIGNIFICANT CHANGE UP (ref 40–120)
ALT FLD-CCNC: 15 U/L — SIGNIFICANT CHANGE UP
ANION GAP SERPL CALC-SCNC: 13 MMOL/L — SIGNIFICANT CHANGE UP (ref 5–17)
APPEARANCE UR: CLEAR — SIGNIFICANT CHANGE UP
AST SERPL-CCNC: 18 U/L — SIGNIFICANT CHANGE UP
BASE EXCESS BLDV CALC-SCNC: 1.6 MMOL/L — SIGNIFICANT CHANGE UP (ref -2–3)
BASOPHILS # BLD AUTO: 0.03 K/UL — SIGNIFICANT CHANGE UP (ref 0–0.2)
BASOPHILS NFR BLD AUTO: 0.5 % — SIGNIFICANT CHANGE UP (ref 0–2)
BILIRUB SERPL-MCNC: 0.4 MG/DL — SIGNIFICANT CHANGE UP (ref 0.4–2)
BILIRUB UR-MCNC: NEGATIVE — SIGNIFICANT CHANGE UP
BUN SERPL-MCNC: 8 MG/DL — SIGNIFICANT CHANGE UP (ref 8–20)
CA-I SERPL-SCNC: 1.14 MMOL/L — LOW (ref 1.15–1.33)
CALCIUM SERPL-MCNC: 9.1 MG/DL — SIGNIFICANT CHANGE UP (ref 8.6–10.2)
CHLORIDE BLDV-SCNC: 95 MMOL/L — LOW (ref 98–107)
CHLORIDE SERPL-SCNC: 91 MMOL/L — LOW (ref 98–107)
CO2 SERPL-SCNC: 24 MMOL/L — SIGNIFICANT CHANGE UP (ref 22–29)
COLOR SPEC: YELLOW — SIGNIFICANT CHANGE UP
CREAT SERPL-MCNC: 0.88 MG/DL — SIGNIFICANT CHANGE UP (ref 0.5–1.3)
DIFF PNL FLD: NEGATIVE — SIGNIFICANT CHANGE UP
EOSINOPHIL # BLD AUTO: 0.14 K/UL — SIGNIFICANT CHANGE UP (ref 0–0.5)
EOSINOPHIL NFR BLD AUTO: 2.5 % — SIGNIFICANT CHANGE UP (ref 0–6)
GAS PNL BLDV: 128 MMOL/L — LOW (ref 136–145)
GAS PNL BLDV: SIGNIFICANT CHANGE UP
GLUCOSE BLDV-MCNC: 607 MG/DL — CRITICAL HIGH (ref 70–99)
GLUCOSE SERPL-MCNC: 511 MG/DL — CRITICAL HIGH (ref 70–99)
GLUCOSE UR QL: 1000 MG/DL
HCO3 BLDV-SCNC: 27 MMOL/L — SIGNIFICANT CHANGE UP (ref 22–29)
HCT VFR BLD CALC: 37.7 % — LOW (ref 39–50)
HCT VFR BLDA CALC: 41 % — SIGNIFICANT CHANGE UP (ref 39–51)
HGB BLD CALC-MCNC: 13.6 G/DL — SIGNIFICANT CHANGE UP (ref 12.6–17.4)
HGB BLD-MCNC: 12.7 G/DL — LOW (ref 13–17)
HIV 1 & 2 AB SERPL IA.RAPID: SIGNIFICANT CHANGE UP
IMM GRANULOCYTES NFR BLD AUTO: 0.2 % — SIGNIFICANT CHANGE UP (ref 0–1.5)
KETONES UR-MCNC: ABNORMAL
LACTATE BLDV-MCNC: 1.4 MMOL/L — SIGNIFICANT CHANGE UP (ref 0.5–2)
LEUKOCYTE ESTERASE UR-ACNC: NEGATIVE — SIGNIFICANT CHANGE UP
LYMPHOCYTES # BLD AUTO: 0.76 K/UL — LOW (ref 1–3.3)
LYMPHOCYTES # BLD AUTO: 13.8 % — SIGNIFICANT CHANGE UP (ref 13–44)
MAGNESIUM SERPL-MCNC: 1.7 MG/DL — SIGNIFICANT CHANGE UP (ref 1.6–2.6)
MCHC RBC-ENTMCNC: 28.4 PG — SIGNIFICANT CHANGE UP (ref 27–34)
MCHC RBC-ENTMCNC: 33.7 GM/DL — SIGNIFICANT CHANGE UP (ref 32–36)
MCV RBC AUTO: 84.3 FL — SIGNIFICANT CHANGE UP (ref 80–100)
MONOCYTES # BLD AUTO: 0.27 K/UL — SIGNIFICANT CHANGE UP (ref 0–0.9)
MONOCYTES NFR BLD AUTO: 4.9 % — SIGNIFICANT CHANGE UP (ref 2–14)
NEUTROPHILS # BLD AUTO: 4.29 K/UL — SIGNIFICANT CHANGE UP (ref 1.8–7.4)
NEUTROPHILS NFR BLD AUTO: 78.1 % — HIGH (ref 43–77)
NITRITE UR-MCNC: NEGATIVE — SIGNIFICANT CHANGE UP
PCO2 BLDV: 44 MMHG — SIGNIFICANT CHANGE UP (ref 42–55)
PH BLDV: 7.39 — SIGNIFICANT CHANGE UP (ref 7.32–7.43)
PH UR: 6 — SIGNIFICANT CHANGE UP (ref 5–8)
PLATELET # BLD AUTO: 169 K/UL — SIGNIFICANT CHANGE UP (ref 150–400)
PO2 BLDV: 143 MMHG — HIGH (ref 25–45)
POTASSIUM BLDV-SCNC: 5.1 MMOL/L — SIGNIFICANT CHANGE UP (ref 3.5–5.1)
POTASSIUM SERPL-MCNC: 5.1 MMOL/L — SIGNIFICANT CHANGE UP (ref 3.5–5.3)
POTASSIUM SERPL-SCNC: 5.1 MMOL/L — SIGNIFICANT CHANGE UP (ref 3.5–5.3)
PROT SERPL-MCNC: 7 G/DL — SIGNIFICANT CHANGE UP (ref 6.6–8.7)
PROT UR-MCNC: NEGATIVE — SIGNIFICANT CHANGE UP
RBC # BLD: 4.47 M/UL — SIGNIFICANT CHANGE UP (ref 4.2–5.8)
RBC # FLD: 12.2 % — SIGNIFICANT CHANGE UP (ref 10.3–14.5)
SAO2 % BLDV: 99.9 % — SIGNIFICANT CHANGE UP
SODIUM SERPL-SCNC: 128 MMOL/L — LOW (ref 135–145)
SP GR SPEC: 1.01 — SIGNIFICANT CHANGE UP (ref 1.01–1.02)
UROBILINOGEN FLD QL: NEGATIVE MG/DL — SIGNIFICANT CHANGE UP
WBC # BLD: 5.5 K/UL — SIGNIFICANT CHANGE UP (ref 3.8–10.5)
WBC # FLD AUTO: 5.5 K/UL — SIGNIFICANT CHANGE UP (ref 3.8–10.5)

## 2022-02-06 PROCEDURE — 82435 ASSAY OF BLOOD CHLORIDE: CPT

## 2022-02-06 PROCEDURE — 80053 COMPREHEN METABOLIC PANEL: CPT

## 2022-02-06 PROCEDURE — 81003 URINALYSIS AUTO W/O SCOPE: CPT

## 2022-02-06 PROCEDURE — 84295 ASSAY OF SERUM SODIUM: CPT

## 2022-02-06 PROCEDURE — 82947 ASSAY GLUCOSE BLOOD QUANT: CPT

## 2022-02-06 PROCEDURE — 99283 EMERGENCY DEPT VISIT LOW MDM: CPT | Mod: 25

## 2022-02-06 PROCEDURE — 82803 BLOOD GASES ANY COMBINATION: CPT

## 2022-02-06 PROCEDURE — 99284 EMERGENCY DEPT VISIT MOD MDM: CPT

## 2022-02-06 PROCEDURE — 82962 GLUCOSE BLOOD TEST: CPT

## 2022-02-06 PROCEDURE — 36415 COLL VENOUS BLD VENIPUNCTURE: CPT

## 2022-02-06 PROCEDURE — 82330 ASSAY OF CALCIUM: CPT

## 2022-02-06 PROCEDURE — 85014 HEMATOCRIT: CPT

## 2022-02-06 PROCEDURE — 85025 COMPLETE CBC W/AUTO DIFF WBC: CPT

## 2022-02-06 PROCEDURE — 85018 HEMOGLOBIN: CPT

## 2022-02-06 PROCEDURE — 96372 THER/PROPH/DIAG INJ SC/IM: CPT

## 2022-02-06 PROCEDURE — 84132 ASSAY OF SERUM POTASSIUM: CPT

## 2022-02-06 PROCEDURE — 83605 ASSAY OF LACTIC ACID: CPT

## 2022-02-06 PROCEDURE — 83735 ASSAY OF MAGNESIUM: CPT

## 2022-02-06 PROCEDURE — 86703 HIV-1/HIV-2 1 RESULT ANTBDY: CPT

## 2022-02-06 PROCEDURE — 82009 KETONE BODYS QUAL: CPT

## 2022-02-06 RX ORDER — SODIUM CHLORIDE 9 MG/ML
2000 INJECTION, SOLUTION INTRAVENOUS ONCE
Refills: 0 | Status: COMPLETED | OUTPATIENT
Start: 2022-02-06 | End: 2022-02-06

## 2022-02-06 RX ORDER — INSULIN HUMAN 100 [IU]/ML
12 INJECTION, SOLUTION SUBCUTANEOUS ONCE
Refills: 0 | Status: COMPLETED | OUTPATIENT
Start: 2022-02-06 | End: 2022-02-06

## 2022-02-06 RX ADMIN — SODIUM CHLORIDE 2000 MILLILITER(S): 9 INJECTION, SOLUTION INTRAVENOUS at 14:07

## 2022-02-06 RX ADMIN — INSULIN HUMAN 12 UNIT(S): 100 INJECTION, SOLUTION SUBCUTANEOUS at 15:18

## 2022-02-06 NOTE — ED PROVIDER NOTE - NSFOLLOWUPINSTRUCTIONS_ED_ALL_ED_FT
1. Return to ED for worsening, progressive or any other concerning symptoms   2. Follow up with your primary care doctor in 2-3days   3. Continue you insulin regimen and follow up with your endocrinologist, be sure to eat appropriately with your insulin     Hyperglycemia    Hyperglycemia occurs when the glucose (sugar) level in your blood is too high. Symptoms include increased urination, increased thirst, a dry mouth, or changes in appetite. If started on a medication, take exactly as prescribed by your health care professional. Maintain a healthy lifestyle and follow up with your primary care physician.    SEEK IMMEDIATE MEDICAL CARE IF YOU HAVE ANY OF THE FOLLOWING SYMPTOMS: shortness of breath, change in mental status, nausea or vomiting, fruity smell to your breath, or any signs of dehydration.
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
## 2022-02-06 NOTE — ED PROVIDER NOTE - ATTENDING CONTRIBUTION TO CARE
I, Zahra Stearns, have personally seen and examined this patient. I have fully participated in the care of this patient. I have reviewed all pertinent clinical information, including history, physical exam, plan and the Medical Student's note and agree except as noted below.     Pt with DM- insulin dependent, has inssues with dosing and sometimes poor compliance, woke up feeling dizzy BS 47 ate and came up to 147 had argument with wife and sugars now in 500s. has had DKA before. denies any infecious sx. no drug use. no cardiac sx.     Gen: NAD, AOx3  Head: NCAT  HEENT: EOMI, oral mucosa moist, normal conjunctiva, neck supple  Lung: CTAB, no respiratory distress  CV: rrr, no murmur, Normal perfusion  Abd: soft, NTND  MSK: No edema, no visible deformities  Neuro: No focal neurologic deficits  Skin: No rash   Psych: normal affect     posisble hyperglycemia from stressed state and no insulin use today. needs to Follow up with endo to discuss adjusting medications. will check basic labs. r/o dka. hydration reasses

## 2022-02-06 NOTE — ED PROVIDER NOTE - NS ED ROS FT
ROS: no CP/SOB. no cough. no fever. no n/v/d/c. no abd pain. no rash. no bleeding. no urinary complaints. no weakness. no vision changes. no HA. no neck/back pain. no extremity swelling/deformity. No change in mental status. ROS: no CP/SOB. no cough. no fever. no n/v/d/c. no abd pain. no rash. no bleeding. no urinary complaints. +weakness. no vision changes. no HA. no neck/back pain. no extremity swelling/deformity. No change in mental status.

## 2022-02-06 NOTE — ED PROVIDER NOTE - PHYSICAL EXAMINATION
PHYSICAL EXAM:  GENERAL: NAD, Resting in bed  HEENT:  Head atraumatic, EOMI, Moist mucous membranes, normal oropharynx  NECK: Supple  CHEST/LUNG: wheezing in b/l lung fields, unlabored respirations  HEART: Regular rate and rhythm; No murmurs, rubs, or gallops  ABDOMEN: Bowel sounds present; Soft, Nontender, Nondistended.  EXTREMITIES:  2+ Peripheral Pulses, brisk capillary refill. No clubbing, cyanosis, or edema  NERVOUS SYSTEM:  Alert & Oriented X3, non-focal and spontaneous movements of all extremities  SKIN: No rashes or lesions

## 2022-02-06 NOTE — ED PROVIDER NOTE - PATIENT PORTAL LINK FT
You can access the FollowMyHealth Patient Portal offered by [facility] by registering at the following website: [email or web address]. By joining [facility]’s FollowMyHealth portal, you will also be able to view your health information using other applications (apps) compatible with our system.

## 2022-02-06 NOTE — ED PROVIDER NOTE - CLINICAL SUMMARY MEDICAL DECISION MAKING FREE TEXT BOX
Pt is a 35yo m with PMHx of DM presenting with hyperglycemia. R/o DKA. BMP, CBC, fluids, BVG, UA, glucose check

## 2022-02-06 NOTE — ED PROVIDER NOTE - PROGRESS NOTE DETAILS
no DKA, hyperglycemia, did not take any insulin today, will give 12U sub Q, TBDC with outpt Follow up -Daryn REED
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
## 2022-11-15 ENCOUNTER — EMERGENCY (EMERGENCY)
Facility: HOSPITAL | Age: 37
LOS: 1 days | Discharge: HOME | End: 2022-11-15
Attending: EMERGENCY MEDICINE
Payer: MEDICAID

## 2022-11-15 VITALS
DIASTOLIC BLOOD PRESSURE: 84 MMHG | TEMPERATURE: 98 F | SYSTOLIC BLOOD PRESSURE: 134 MMHG | HEIGHT: 64 IN | RESPIRATION RATE: 18 BRPM | OXYGEN SATURATION: 96 % | HEART RATE: 104 BPM | WEIGHT: 179.9 LBS

## 2022-11-15 VITALS
SYSTOLIC BLOOD PRESSURE: 113 MMHG | RESPIRATION RATE: 18 BRPM | DIASTOLIC BLOOD PRESSURE: 75 MMHG | OXYGEN SATURATION: 95 % | HEART RATE: 93 BPM | TEMPERATURE: 99 F

## 2022-11-15 PROCEDURE — 99284 EMERGENCY DEPT VISIT MOD MDM: CPT

## 2022-11-15 PROCEDURE — 99285 EMERGENCY DEPT VISIT HI MDM: CPT

## 2022-11-15 NOTE — ED PROVIDER NOTE - CLINICAL SUMMARY MEDICAL DECISION MAKING FREE TEXT BOX
38 y/o M presents after being narcaned by a friend. Hemodynamically stable, clinically sober. I discussed precautions in using opioid drugs. Patient wants to go home, stable for discharge.

## 2022-11-15 NOTE — ED PROVIDER NOTE - PHYSICAL EXAMINATION
Gen: Well appearing in NAD  Head: NC/AT, pinpoint pupils  Neck: trachea midline  Resp:  No distress  Ext: no deformities  Neuro:  A&O appears non focal  Skin:  Warm and dry as visualized  Psych:  Normal affect and mood

## 2022-11-15 NOTE — ED ADULT TRIAGE NOTE - CHIEF COMPLAINT QUOTE
pt reports he OD on Heroin, given 4 mg IN Narcan. reports no use of other meds or drugs, denies Methadone use. pt AOX3, calm and cooperative.

## 2022-11-15 NOTE — ED PROVIDER NOTE - OBJECTIVE STATEMENT
38 y/o M with PMH DM1 presents for overdose, got narcaned by a friend. He states "I did something I shouldn't have done, I feel fine, I want to go home." He denies nausea, vomiting, SOB. Patient has been waiting for 2 hours in the ED already.

## 2022-11-15 NOTE — ED PROVIDER NOTE - PATIENT PORTAL LINK FT
You can access the FollowMyHealth Patient Portal offered by [facility] by registering at the following website: [email or web address]. By joining [facility]’s FollowMyHealth portal, you will also be able to view your health information using other applications (apps) compatible with our system.

## 2022-11-15 NOTE — SBIRT NOTE ADULT - NSSBIRTDRGFEELBADGUILT_GEN_A_CORE
Assessment and Plan    1. Essential hypertension  Now at goal, continue ACE  Recheck BMP now on ACE for first time  - METABOLIC PANEL, BASIC    2. Controlled type 2 diabetes mellitus without complication, without long-term current use of insulin (HCC)  Continue metformin and diet  Rec vitamin with B12 for use with metformin    3. Hypercholesteremia  Now on statin      Follow-up and Dispositions    · Return in about 6 months (around 1/15/2020) for Diabetes follow up, Blood pressure follow up, Medication follow up. Diagnosis and plan discussed with patient who verbillized understanding. History of present Jasvir Choi is a 45 y.o. male presenting for Medication Refill (Lab ordes )    HTN  Tolerating ACE without sx  No cough or other problems    DM2 remains on metformin  Sticking to diet    Hyperchol  On statin and tolerating well    Review of Systems   Constitutional: Negative for weight loss. Respiratory: Negative. Negative for cough. Cardiovascular: Negative. Musculoskeletal: Negative for joint pain and myalgias. Past Medical History:   Diagnosis Date    Diabetes ([facility] [address]     Hypercholesteremia     Hypertension     Inguinal hernia     bilateral unrepaired     Past Surgical History:   Procedure Laterality Date    HX COLONOSCOPY  04/2018    Polyp, repeat in 5 years, Dr. Jaki Bennett.      Family History   Problem Relation Age of Onset    No Known Problems Mother     Cancer Father         prostate     Social History     Socioeconomic History    Marital status: UNKNOWN     Spouse name: Not on file    Number of children: Not on file    Years of education: Not on file    Highest education level: Not on file   Occupational History    Not on file   Social Needs    Financial resource strain: Not on file    Food insecurity:     Worry: Not on file     Inability: Not on file    Transportation needs:     Medical: Not on file     Non-medical: Not on file   Tobacco Use    Smoking status: Current Some Day Smoker     Types: Cigarettes, Pipe     Last attempt to quit: 10/24/2013     Years since quittin.7    Smokeless tobacco: Never Used   Substance and Sexual Activity    Alcohol use: Yes    Drug use: No    Sexual activity: Yes   Lifestyle    Physical activity:     Days per week: Not on file     Minutes per session: Not on file    Stress: Not on file   Relationships    Social connections:     Talks on phone: Not on file     Gets together: Not on file     Attends L[employer] service: Not on file     Active member of club or organization: Not on file     Attends meetings of clubs or organizations: Not on file     Relationship status: Not on file    Intimate partner violence:     Fear of current or ex partner: Not on file     Emotionally abused: Not on file     Physically abused: Not on file     Forced sexual activity: Not on file   Other Topics Concern    Not on file   Social History Narrative    Not on file         Current Outpatient Medications   Medication Sig Dispense Refill    metFORMIN ER (GLUCOPHAGE XR) 500 mg tablet TAKE 2 TABLETS BY MOUTH  ONCE DAILY 180 Tab 1    lisinopril (PRINIVIL, ZESTRIL) 10 mg tablet Take 1 Tab by mouth daily. 90 Tab 1    lovastatin (MEVACOR) 20 mg tablet Take 1 Tab by mouth nightly. 90 Tab 1         No Known Allergies    Vitals:    07/15/19 1003   BP: 119/82   Pulse: 64   Resp: 18   Temp: 98.3 °F (36.8 °C)   TempSrc: Oral   Weight: 242 lb (109.8 kg)   Height: 6' 1\" (1.854 m)     Body mass index is 31.93 kg/m². Objective  General: Patient alert and oriented and in NAD  Neck: No thyromegaly or cervical lymphadenopathy  Cardiovascular: Heart has regular rate and rhythm, No murmurs, rubs or gallops. No edema  Respiratory: Lungs are clear to auscultation bilaterally, no wheezing, rales or rhonchi, normal chest excursion and no increased work of breathing.   Musculoskeletal: All four extremities present and functional.   Skin: No rashes or lesions noted on exposed skin  Neuro: AAOx3, normal gait and speech. No gross neurologic deficits. Psych: Appropriate mood and affect, no homicidal or suicidal ideation, no obsessions, delusions or hallucinations, normal psychomotor status. No

## 2022-11-15 NOTE — SBIRT NOTE ADULT - NSSBIRTDRGPASSREFTXDET_GEN_A_CORE
Passive Referral: Referral for complete assessment and level of care determination at a certified treatment facility was   completed by giving the patient information for treatment facilities that met their needs and encouraging them to call   for an appointment. A call was not made to a facility because

## 2022-11-15 NOTE — ED ADULT NURSE NOTE - OBJECTIVE STATEMENT
pt reports he snorted one bump of heroin at his friends house. reports he uses occasionally and has been at least a year since the last time he used heroin. pt reports his friend told him he "wasn't looking so hot" and called 911. he was given 4mg intranasal narcan pta. arrives a and o x3, breathing even and unlabored with no complaints.

## 2022-11-15 NOTE — ED PROVIDER NOTE - NS ED ROS FT
Const: no fevers, no chills  HEENT: no rhinorrhea, no sore throat  Cardiac: no palpitations, no chest pain  Resp: no wheezing, no SOB  ABD: no ABD pain, no vomiting, no diarrhea  : no dysuria, no discharge  Ext: no deformity  Skin: no rashes, no lacerations

## 2023-02-10 ENCOUNTER — EMERGENCY (EMERGENCY)
Facility: HOSPITAL | Age: 38
LOS: 1 days | Discharge: HOME | End: 2023-02-10
Attending: STUDENT IN AN ORGANIZED HEALTH CARE EDUCATION/TRAINING PROGRAM
Payer: MEDICAID

## 2023-02-10 VITALS
OXYGEN SATURATION: 97 % | SYSTOLIC BLOOD PRESSURE: 127 MMHG | RESPIRATION RATE: 16 BRPM | WEIGHT: 160.06 LBS | HEART RATE: 101 BPM | DIASTOLIC BLOOD PRESSURE: 84 MMHG | TEMPERATURE: 98 F

## 2023-02-10 PROCEDURE — 73030 X-RAY EXAM OF SHOULDER: CPT

## 2023-02-10 PROCEDURE — 99284 EMERGENCY DEPT VISIT MOD MDM: CPT

## 2023-02-10 PROCEDURE — 73030 X-RAY EXAM OF SHOULDER: CPT | Mod: 26,RT

## 2023-02-10 PROCEDURE — 99283 EMERGENCY DEPT VISIT LOW MDM: CPT

## 2023-02-10 RX ORDER — OXYCODONE AND ACETAMINOPHEN 5; 325 MG/1; MG/1
1 TABLET ORAL
Qty: 6 | Refills: 0
Start: 2023-02-10 | End: 2023-02-12

## 2023-02-10 NOTE — ED PROVIDER NOTE - MUSCULOSKELETAL, MLM
Spine appears normal, no muscle or joint tenderness on palpation, movement limited of R shoulder on extension, abduction and rotation. no mass, swelling, ecchymosis, skin changes to R shoulder. Spine appears normal, no muscle or joint tenderness on palpation, movement limited of R shoulder on extension, abduction and rotation. no mass, swelling, ecchymosis, skin changes to R shoulder.. no step-offs or crepitus. neg sulcus

## 2023-02-10 NOTE — ED PROVIDER NOTE - CLINICAL SUMMARY MEDICAL DECISION MAKING FREE TEXT BOX
36yo M PMH DMT1 presented to ED c/o R shoulder pain and stiffness. no tenderness to palpation on exam, limited extension and rotation movement, full ROM on flexion. ordered XR to r/o tendinopathy causes and r/o fx/subluxation. ordered perc for pain control. presentation likely due to adhesive capsulitis, possible RTC tear. pt has apt with ortho wed. 36yo M PMH DMT1 presented to ED c/o R shoulder pain and stiffness. no tenderness to palpation on exam, limited extension, abduction and rotation movement, no step-offs, crepitus. ordered XR to r/o tendinopathy causes and r/o fx/subluxation. ordered perc for pain control. presentation likely due to adhesive capsulitis, possible RTC tear. pt has apt with ortho wed. 38yo M PMH DMT1 presented to ED c/o R shoulder pain and stiffness. no tenderness to palpation on exam, limited extension, abduction and rotation movement, no step-offs, crepitus. ordered XR to r/o tendinopathy causes and r/o fx/subluxation, XR neg. ordered perc for pain control, sent to pharm. presentation likely due to adhesive capsulitis, possible RTC tear. pt has apt with ortho wed. 36yo M PMH DMT1 presented to ED c/o R shoulder pain and stiffness. hx and physical as noted above. ordered XR to eval for calcific tendinosis vs adhesive capsulitis vs occult fracture/subluxation, unremarkable. ordered perc for pain control, sent to pharm. , possible rotator cuff pathology. pt has apt with ortho wed.

## 2023-02-10 NOTE — ED PROVIDER NOTE - NSFOLLOWUPINSTRUCTIONS_ED_ALL_ED_FT
Tendinitis    Anatomy of the shoulder showing an inflamed tendon.   Tendinitis is irritation and swelling (inflammation) of a tendon. A tendon is a cord of tissue that connects muscle to bone. Tendinitis is most common in the shoulder, ankle, elbow, or wrist.      What are the causes?    •Using a tendon or muscle too much (overuse). This is the most common cause.      •Wear and tear that happens as you age.      •Injury.       •Some medical conditions, such as arthritis.      •Some medicines.        What increases the risk?    You are more likely to get this condition if you do activities that involve the same movements over and over again (repetitive motions).      What are the signs or symptoms?    •Pain.       •Tenderness.       •Mild swelling.       •Decreased range of motion.        How is this treated?    This condition is usually treated with RICE therapy. RICE stands for:  •Rest.      •Ice.      •Compression. This means putting pressure on the affected area.      •Elevation. This means raising the affected area above the level of your heart.      Treatment may also include:  •Medicines for swelling or pain.      •Exercises or physical therapy to help your tendon move better and get stronger.      •A brace or splint.      •A shot (injection) of a type of medicine called corticosteroid.      •Surgery. This is rarely needed.        Follow these instructions at home:    If you have a splint or brace that can be taken off:     •Wear the splint or brace as told by your doctor. Take it off only as told by your doctor.      •Check the skin around the splint or brace every day. Tell your doctor if you see problems.    •Loosen the splint or brace if your fingers or toes:  •Tingle.      •Become numb.      •Turn cold and blue.        •Keep the splint or brace clean.    •If the splint or brace is not waterproof:  •Do not let it get wet.      •Cover it with a watertight covering when you take a bath or shower, or take it off as told by your doctor.          Managing pain, stiffness, and swelling       Bag of ice on a towel on the skin.       A heating pad being used on the affected area.   •If told, put ice on the affected area. To do this:  •If you have a removable splint or brace, take it off as told by your doctor.      •Put ice in a plastic bag.      •Place a towel between your skin and the bag.      •Leave the ice on for 20 minutes, 2–3 times a day.      •Take off the ice if your skin turns bright red. This is very important. If you cannot feel pain, heat, or cold, you have a greater risk of damage to the area.        •Move the fingers or toes of the affected arm or leg often, if this applies.      •If told, raise the affected area above the level of your heart while you are sitting or lying down.    •If told, put heat on the affected area before you exercise. Use the heat source that your doctor recommends, such as a moist heat pack or a heating pad.  •Place a towel between your skin and the heat source.      •Leave the heat on for 20–30 minutes.      •Take off the heat if your skin turns bright red. This is very important. If you cannot feel pain, heat, or cold, you have a greater risk of getting burned.        Activity     •Rest the affected area as told by your doctor.      •Ask your doctor when it is safe to drive if you have a splint or brace on any part of your arm or leg.      •Return to your normal activities when your doctor says that it is safe.      •Avoid using the affected area while you have symptoms.      •Do exercises as told by your doctor.      General instructions     •Wear an elastic bandage or pressure (compression) wrap only as told by your doctor.      •Take over-the-counter and prescription medicines only as told by your doctor.      •Keep all follow-up visits.        Contact a doctor if:    •You do not get better.      •You get new problems, such as numbness in your hands or feet, and you do not know why.        Summary    •Tendinitis is irritation and swelling (inflammation) of a tendon.      •You are more likely to get this condition if you do activities that involve the same movements over and over again.      •This condition is usually treated with RICE therapy. RICE stands for rest, ice, compression, and elevate.      •Avoid using the affected area while you have symptoms.      This information is not intended to replace advice given to you by your health care provider. Make sure you discuss any questions you have with your health care provider.

## 2023-02-10 NOTE — ED ADULT TRIAGE NOTE - CHIEF COMPLAINT QUOTE
Ambulatory to ED c/o atraumatic R shoulder pain x1 week. Patient states pain became worse last PM. At triage, + ROM in RUE.

## 2023-02-10 NOTE — ED PROVIDER NOTE - PHYSICAL EXAMINATION
R arm with weakness on empty can test, unable to abduct R shoulder past 90 degrees, distally neurovascularly intact otherwise.

## 2023-02-10 NOTE — ED PROVIDER NOTE - OBJECTIVE STATEMENT
38yo M PMH DMT1 (in insulin) presents to ED c/o R shoulder pain and stiffness x3w. Pt denies injury/fall/trauma. Pt reports pain began after waking up and has been gradually progressing. Pt reports taking handful of Motrin 800mg, no relief. denies fever, swelling, bruising, numbness, weakness. pt has apt with ortho on Wednesday for further eval. 36yo M PMH DMT1 (in insulin) presents to ED c/o R shoulder pain and stiffness x3w. Pt denies injury/fall/trauma. Pt reports pain began after waking up and has been gradually progressing. Pt reports taking handful of Motrin 800mg, no relief. pain worse with movement. denies fever, swelling, bruising, numbness, weakness. pt has apt with ortho on Wednesday for further eval. 38yo M PMH DMT1 (in insulin) presents to ED c/o R shoulder pain and stiffness x3w. Pt denies injury/fall/trauma. Pt reports pain began after waking up and has been gradually progressing. Did get a message around that time and states maybe 2/2 stretching. Pt reports taking handful of Motrin 800mg, no relief. pain worse with movement. denies fever, swelling, bruising, numbness, weakness. pt has apt with ortho on Wednesday for further eval.

## 2023-02-15 ENCOUNTER — APPOINTMENT (OUTPATIENT)
Dept: ORTHOPEDIC SURGERY | Facility: CLINIC | Age: 38
End: 2023-02-15
Payer: MEDICAID

## 2023-02-15 VITALS
WEIGHT: 175 LBS | HEART RATE: 103 BPM | BODY MASS INDEX: 25.05 KG/M2 | HEIGHT: 70 IN | SYSTOLIC BLOOD PRESSURE: 104 MMHG | DIASTOLIC BLOOD PRESSURE: 72 MMHG

## 2023-02-15 DIAGNOSIS — Z86.39 PERSONAL HISTORY OF OTHER ENDOCRINE, NUTRITIONAL AND METABOLIC DISEASE: ICD-10-CM

## 2023-02-15 PROCEDURE — 99204 OFFICE O/P NEW MOD 45 MIN: CPT

## 2023-02-15 RX ORDER — MELATONIN 3 MG
TABLET ORAL
Refills: 0 | Status: ACTIVE | COMMUNITY

## 2023-02-15 RX ORDER — MELOXICAM 15 MG/1
15 TABLET ORAL
Qty: 21 | Refills: 0 | Status: ACTIVE | COMMUNITY
Start: 2023-02-15 | End: 1900-01-01

## 2023-02-17 ENCOUNTER — APPOINTMENT (OUTPATIENT)
Dept: MRI IMAGING | Facility: CLINIC | Age: 38
End: 2023-02-17
Payer: MEDICAID

## 2023-02-17 ENCOUNTER — OUTPATIENT (OUTPATIENT)
Dept: OUTPATIENT SERVICES | Facility: HOSPITAL | Age: 38
LOS: 1 days | End: 2023-02-17
Payer: MEDICAID

## 2023-02-17 DIAGNOSIS — M25.511 PAIN IN RIGHT SHOULDER: ICD-10-CM

## 2023-02-17 PROCEDURE — 73221 MRI JOINT UPR EXTREM W/O DYE: CPT | Mod: 26,RT

## 2023-02-17 PROCEDURE — 73221 MRI JOINT UPR EXTREM W/O DYE: CPT

## 2023-03-01 ENCOUNTER — APPOINTMENT (OUTPATIENT)
Dept: ORTHOPEDIC SURGERY | Facility: CLINIC | Age: 38
End: 2023-03-01
Payer: MEDICAID

## 2023-03-01 VITALS
SYSTOLIC BLOOD PRESSURE: 118 MMHG | WEIGHT: 175 LBS | DIASTOLIC BLOOD PRESSURE: 79 MMHG | HEIGHT: 70 IN | BODY MASS INDEX: 25.05 KG/M2 | HEART RATE: 102 BPM

## 2023-03-01 DIAGNOSIS — M25.511 PAIN IN RIGHT SHOULDER: ICD-10-CM

## 2023-03-01 PROCEDURE — 99213 OFFICE O/P EST LOW 20 MIN: CPT

## 2023-03-01 RX ORDER — NABUMETONE 500 MG/1
500 TABLET, FILM COATED ORAL
Qty: 60 | Refills: 0 | Status: ACTIVE | COMMUNITY
Start: 2023-03-01 | End: 1900-01-01

## 2023-03-03 NOTE — ED ADULT TRIAGE NOTE - MODE OF ARRIVAL
Walk in [place]: Heavy menses. C/o dizziness, SOB x months. Sent from PCP [facility] b/c Hb 6.1. No anemia symptoms (for example, shortness of breath, dizziness, or chest pain). Check Hb. Likely CDU for [facility].

## 2023-04-19 ENCOUNTER — APPOINTMENT (OUTPATIENT)
Dept: PHYSICAL MEDICINE AND REHAB | Facility: CLINIC | Age: 38
End: 2023-04-19
Payer: MEDICAID

## 2023-04-19 VITALS
HEIGHT: 70 IN | RESPIRATION RATE: 14 BRPM | BODY MASS INDEX: 26.48 KG/M2 | DIASTOLIC BLOOD PRESSURE: 76 MMHG | WEIGHT: 185 LBS | SYSTOLIC BLOOD PRESSURE: 112 MMHG | HEART RATE: 93 BPM

## 2023-04-19 PROCEDURE — 95886 MUSC TEST DONE W/N TEST COMP: CPT | Mod: RT

## 2023-04-19 PROCEDURE — 95909 NRV CNDJ TST 5-6 STUDIES: CPT

## 2023-04-19 NOTE — PROCEDURE
[de-identified] : PRE-PROCEDURE\par \par * Was H&P completed and in chart at time of procedure ?  YES\par * Were the indications for the procedure appropriate ?  YES\par * Were the practitioner's entries in the patient's medical record appropriate ?  YES\par \par PROCEDURE\par * Did the practitioner maintain proper sterile technique ?  YES\par * If bleeding was encountered, did the practitioner manage it appropriately?  YES\par * Were complications, if any, recognized and managed appropriately?  YES\par * Was the practitioner's use of diagnostic services (e.g., lab, x-ray, MRI, CT) appropriate?  YES\par \par POST-PROCEDURE\par * Did the pre-procedure diagnosis coincide with the findings of the procedure?  YES\par * Was the procedure report complete, accurate and timely?  YES\par

## 2023-04-19 NOTE — ASSESSMENT
[FreeTextEntry1] : EMG/NCS RIGHT UE performed at today's office visit.  Electrodiagnostic findings are highly suspicious for relatively acute, severe, C5/6 radiculopathy affecting the motor axons.  There is no definitive electrodiagnostic evidence to support an upper trunk or posterior cord brachial plexopathy.  Full report to follow.

## 2023-04-24 ENCOUNTER — APPOINTMENT (OUTPATIENT)
Dept: ORTHOPEDIC SURGERY | Facility: CLINIC | Age: 38
End: 2023-04-24

## 2023-04-29 ENCOUNTER — APPOINTMENT (OUTPATIENT)
Dept: MRI IMAGING | Facility: CLINIC | Age: 38
End: 2023-04-29

## 2023-05-09 ENCOUNTER — APPOINTMENT (OUTPATIENT)
Dept: PHYSICAL MEDICINE AND REHAB | Facility: CLINIC | Age: 38
End: 2023-05-09

## 2023-05-18 NOTE — ED ADULT TRIAGE NOTE - WEIGHT IN LBS
Additional Notes: Did not accept her insurance
160
Detail Level: Zone
Render Risk Assessment In Note?: no

## 2023-05-19 ENCOUNTER — APPOINTMENT (OUTPATIENT)
Dept: MRI IMAGING | Facility: CLINIC | Age: 38
End: 2023-05-19
Payer: MEDICAID

## 2023-05-19 ENCOUNTER — OUTPATIENT (OUTPATIENT)
Dept: OUTPATIENT SERVICES | Facility: HOSPITAL | Age: 38
LOS: 1 days | End: 2023-05-19
Payer: MEDICAID

## 2023-05-19 DIAGNOSIS — Z00.8 ENCOUNTER FOR OTHER GENERAL EXAMINATION: ICD-10-CM

## 2023-05-19 PROCEDURE — 72141 MRI NECK SPINE W/O DYE: CPT

## 2023-05-19 PROCEDURE — 72141 MRI NECK SPINE W/O DYE: CPT | Mod: 26

## 2023-06-01 ENCOUNTER — APPOINTMENT (OUTPATIENT)
Dept: PHYSICAL MEDICINE AND REHAB | Facility: CLINIC | Age: 38
End: 2023-06-01
Payer: MEDICAID

## 2023-06-13 ENCOUNTER — APPOINTMENT (OUTPATIENT)
Dept: ORTHOPEDIC SURGERY | Facility: CLINIC | Age: 38
End: 2023-06-13
Payer: MEDICAID

## 2023-06-13 ENCOUNTER — APPOINTMENT (OUTPATIENT)
Dept: PHYSICAL MEDICINE AND REHAB | Facility: CLINIC | Age: 38
End: 2023-06-13
Payer: MEDICAID

## 2023-06-13 VITALS
HEART RATE: 81 BPM | RESPIRATION RATE: 16 BRPM | HEIGHT: 70 IN | SYSTOLIC BLOOD PRESSURE: 124 MMHG | DIASTOLIC BLOOD PRESSURE: 87 MMHG | BODY MASS INDEX: 26.48 KG/M2 | WEIGHT: 185 LBS

## 2023-06-13 VITALS
TEMPERATURE: 97.1 F | SYSTOLIC BLOOD PRESSURE: 119 MMHG | BODY MASS INDEX: 26.48 KG/M2 | HEIGHT: 70 IN | DIASTOLIC BLOOD PRESSURE: 87 MMHG | HEART RATE: 90 BPM | WEIGHT: 185 LBS

## 2023-06-13 DIAGNOSIS — M50.20 OTHER CERVICAL DISC DISPLACEMENT, UNSPECIFIED CERVICAL REGION: ICD-10-CM

## 2023-06-13 DIAGNOSIS — M54.12 RADICULOPATHY, CERVICAL REGION: ICD-10-CM

## 2023-06-13 PROCEDURE — 99215 OFFICE O/P EST HI 40 MIN: CPT

## 2023-06-13 PROCEDURE — 99214 OFFICE O/P EST MOD 30 MIN: CPT

## 2023-06-13 PROCEDURE — 72040 X-RAY EXAM NECK SPINE 2-3 VW: CPT

## 2023-06-13 NOTE — ASSESSMENT
[FreeTextEntry1] : 38-year-old right-hand-dominant male with poorly controlled type 1 diabetes found to have relatively acute, severe, C5/6 radiculopathy affecting the motor axons.  I spent most of today's office visit (25 minutes) reviewing the patient's EMG, MRI C-spine, discussing etiology, pathogenesis and further nonoperative versus operative management.  Based upon the size of the patient's C4-C5 HNP, electrodiagnostic findings and weakness, I have recommended orthopedic surgical spinal consultation as soon as possible.  There is no indication for cervical epidural steroid injection in this case.  I do not think that the patient's clinical condition would improve with physical therapy at this time.  I explained that the patient needs to get his diabetes under control which she is working on.  I referred him to my colleague Dr. Neely who will hopefully see him today.  Patient is in agreement with the plan.  All questions have been answered.  Return to office 6-8 weeks postop.

## 2023-06-13 NOTE — DATA REVIEWED
[EMG] : EMG [FreeTextEntry1] : EMG/NCS RIGHT UE (4/19/23): Electrodiagnostic findings are highly suspicious for relatively acute, severe, C5/6 radiculopathy affecting the motor axons.  There is no definitive electrodiagnostic evidence to support an upper trunk or posterior cord brachial plexopathy.  \par \par MRI C-SPINE (May 2023): \par \par FINDINGS: The cervical cord is normal in signal. There are chronic mild compression deformities of the superior endplates of T1 and T2. The other vertebral body heights are maintained. There is straightening of the normal cervical lordosis. Alignment is otherwise normal.\par \par C2-C3: Mild uncovertebral spurring on the right. Mild right foraminal narrowing. No spinal canal narrowing.\par \par C3-C4: No spinal canal or foraminal narrowing.\par \par C4-C5: Large right paracentral-foraminal disc extrusion with caudal extension of disc material, nearly to the level of the inferior endplate of C5. This effaces the anterior thecal sac and slightly flattens the right ventral cord. Severe right foraminal narrowing. Mild spinal canal narrowing.\par \par C5-C6:. No spinal canal or foraminal narrowing.\par \par C6-C7: No spinal canal or foraminal narrowing.\par \par C7-T1: No spinal canal or foraminal narrowing.\par \par There is some high T2 signal in the right posterior paraspinal musculature suggestive of subacute denervation related to the large disc herniation at C4-C5

## 2023-06-13 NOTE — HISTORY OF PRESENT ILLNESS
[FreeTextEntry1] : 38-year-old right-hand-dominant male returns to office for EMG and MRI review.  Patient relates a 3 to 4-month history of right posterolateral shoulder pain and weakness.  He denies antecedent trauma or injury to his cervical spine or right shoulder.  He denies antecedent viral infection, vaccine administration or COVID infection.  He does relate a history of poorly controlled type 1 diabetes with occasional numbness/tingling/burning in his feet.  Prior MRI right shoulder consistent with feathery edema in the supraspinatus and infraspinatus muscles.  Patient denies significant pain in his shoulder.

## 2023-06-13 NOTE — PHYSICAL EXAM
[FreeTextEntry1] : No acute distress\par Alert and oriented x3\par Nonobese\par Inspection\par No shoulder girdle atrophy\par Manual muscle testing 3+/5 right deltoid/supraspinatus (static); 4/5 right infraspinatus (slightly improved); 4+/5 right biceps (static); 5-/5 right triceps (static); 5/5 wrist extension \par SI LT\par DTRs: Depressed biceps/triceps/brachioradialis

## 2023-06-26 ENCOUNTER — NON-APPOINTMENT (OUTPATIENT)
Age: 38
End: 2023-06-26

## 2023-08-23 ENCOUNTER — OUTPATIENT (OUTPATIENT)
Dept: OUTPATIENT SERVICES | Facility: HOSPITAL | Age: 38
LOS: 1 days | End: 2023-08-23
Payer: MEDICAID

## 2023-08-23 VITALS
RESPIRATION RATE: 20 BRPM | WEIGHT: 191.36 LBS | TEMPERATURE: 97 F | DIASTOLIC BLOOD PRESSURE: 70 MMHG | SYSTOLIC BLOOD PRESSURE: 120 MMHG | HEART RATE: 97 BPM | OXYGEN SATURATION: 100 % | HEIGHT: 70 IN

## 2023-08-23 DIAGNOSIS — Z29.9 ENCOUNTER FOR PROPHYLACTIC MEASURES, UNSPECIFIED: ICD-10-CM

## 2023-08-23 DIAGNOSIS — Z01.818 ENCOUNTER FOR OTHER PREPROCEDURAL EXAMINATION: ICD-10-CM

## 2023-08-23 DIAGNOSIS — Z98.890 OTHER SPECIFIED POSTPROCEDURAL STATES: Chronic | ICD-10-CM

## 2023-08-23 DIAGNOSIS — M50.20 OTHER CERVICAL DISC DISPLACEMENT, UNSPECIFIED CERVICAL REGION: ICD-10-CM

## 2023-08-23 LAB
A1C WITH ESTIMATED AVERAGE GLUCOSE RESULT: 8.4 % — HIGH (ref 4–5.6)
ANION GAP SERPL CALC-SCNC: 9 MMOL/L — SIGNIFICANT CHANGE UP (ref 5–17)
APTT BLD: 30.9 SEC — SIGNIFICANT CHANGE UP (ref 24.5–35.6)
BASOPHILS # BLD AUTO: 0.02 K/UL — SIGNIFICANT CHANGE UP (ref 0–0.2)
BASOPHILS NFR BLD AUTO: 0.4 % — SIGNIFICANT CHANGE UP (ref 0–2)
BLD GP AB SCN SERPL QL: SIGNIFICANT CHANGE UP
BUN SERPL-MCNC: 11.1 MG/DL — SIGNIFICANT CHANGE UP (ref 8–20)
CALCIUM SERPL-MCNC: 9.2 MG/DL — SIGNIFICANT CHANGE UP (ref 8.4–10.5)
CHLORIDE SERPL-SCNC: 102 MMOL/L — SIGNIFICANT CHANGE UP (ref 96–108)
CO2 SERPL-SCNC: 27 MMOL/L — SIGNIFICANT CHANGE UP (ref 22–29)
CREAT SERPL-MCNC: 0.82 MG/DL — SIGNIFICANT CHANGE UP (ref 0.5–1.3)
EGFR: 115 ML/MIN/1.73M2 — SIGNIFICANT CHANGE UP
EOSINOPHIL # BLD AUTO: 0.18 K/UL — SIGNIFICANT CHANGE UP (ref 0–0.5)
EOSINOPHIL NFR BLD AUTO: 3.4 % — SIGNIFICANT CHANGE UP (ref 0–6)
ESTIMATED AVERAGE GLUCOSE: 194 MG/DL — HIGH (ref 68–114)
GLUCOSE SERPL-MCNC: 186 MG/DL — HIGH (ref 70–99)
HCT VFR BLD CALC: 38.8 % — LOW (ref 39–50)
HGB BLD-MCNC: 12.6 G/DL — LOW (ref 13–17)
IMM GRANULOCYTES NFR BLD AUTO: 0.2 % — SIGNIFICANT CHANGE UP (ref 0–0.9)
INR BLD: 1.04 RATIO — SIGNIFICANT CHANGE UP (ref 0.85–1.18)
LYMPHOCYTES # BLD AUTO: 1.04 K/UL — SIGNIFICANT CHANGE UP (ref 1–3.3)
LYMPHOCYTES # BLD AUTO: 19.4 % — SIGNIFICANT CHANGE UP (ref 13–44)
MCHC RBC-ENTMCNC: 28.3 PG — SIGNIFICANT CHANGE UP (ref 27–34)
MCHC RBC-ENTMCNC: 32.5 GM/DL — SIGNIFICANT CHANGE UP (ref 32–36)
MCV RBC AUTO: 87 FL — SIGNIFICANT CHANGE UP (ref 80–100)
MONOCYTES # BLD AUTO: 0.3 K/UL — SIGNIFICANT CHANGE UP (ref 0–0.9)
MONOCYTES NFR BLD AUTO: 5.6 % — SIGNIFICANT CHANGE UP (ref 2–14)
MRSA PCR RESULT.: SIGNIFICANT CHANGE UP
NEUTROPHILS # BLD AUTO: 3.82 K/UL — SIGNIFICANT CHANGE UP (ref 1.8–7.4)
NEUTROPHILS NFR BLD AUTO: 71 % — SIGNIFICANT CHANGE UP (ref 43–77)
PLATELET # BLD AUTO: 200 K/UL — SIGNIFICANT CHANGE UP (ref 150–400)
POTASSIUM SERPL-MCNC: 5.1 MMOL/L — SIGNIFICANT CHANGE UP (ref 3.5–5.3)
POTASSIUM SERPL-SCNC: 5.1 MMOL/L — SIGNIFICANT CHANGE UP (ref 3.5–5.3)
PROTHROM AB SERPL-ACNC: 11.5 SEC — SIGNIFICANT CHANGE UP (ref 9.5–13)
RBC # BLD: 4.46 M/UL — SIGNIFICANT CHANGE UP (ref 4.2–5.8)
RBC # FLD: 12.1 % — SIGNIFICANT CHANGE UP (ref 10.3–14.5)
S AUREUS DNA NOSE QL NAA+PROBE: DETECTED
SODIUM SERPL-SCNC: 138 MMOL/L — SIGNIFICANT CHANGE UP (ref 135–145)
WBC # BLD: 5.37 K/UL — SIGNIFICANT CHANGE UP (ref 3.8–10.5)
WBC # FLD AUTO: 5.37 K/UL — SIGNIFICANT CHANGE UP (ref 3.8–10.5)

## 2023-08-23 PROCEDURE — 93010 ELECTROCARDIOGRAM REPORT: CPT

## 2023-08-23 PROCEDURE — 93005 ELECTROCARDIOGRAM TRACING: CPT

## 2023-08-23 PROCEDURE — G0463: CPT

## 2023-08-23 RX ORDER — HUMAN INSULIN 100 [IU]/ML
25 INJECTION, SUSPENSION SUBCUTANEOUS
Qty: 0 | Refills: 0 | DISCHARGE

## 2023-08-23 RX ORDER — MUPIROCIN 20 MG/G
1 OINTMENT TOPICAL
Qty: 1 | Refills: 0
Start: 2023-08-23 | End: 2023-08-27

## 2023-08-23 NOTE — H&P PST ADULT - NSICDXFAMILYHX_GEN_ALL_CORE_FT
FAMILY HISTORY:  Father  Still living? Unknown  FH: type 1 diabetes mellitus, Age at diagnosis: Age Unknown

## 2023-08-23 NOTE — H&P PST ADULT - HISTORY OF PRESENT ILLNESS
Pt is a 38 years old male seen today pre-op for Cervical disc arthroplasty.  Pt reports  right shoulder and bicep weakness going on approximately 3 months. Pt MRI and EMGs revealing upper extremity cervical radiculitis. Patient is a type I diabetic. Symptoms occurred without precipitating injury. He has been working out at the gym but is unable to continue lifting weights Pt is a 38 years old male with right dominant, seen today pre-op for Cervical disc arthroplasty.  Pt reports  right shoulder and bicep weakness going on approximately February 2023  Pt MRI and EMGs revealing upper extremity cervical radiculitis. Patient is a type I diabetic. Symptoms occurred without precipitating injury. He has been working out at the gym but is unable to continue lifting weights Pt reports right arm weakness, numbness and tingling of right upper arm. Pt reports prior conservative treatment of Physical therapy, right arm pain aggravated with activity, like lifting, no acute change  in bowel and bladder habits  Pt is a 38 years old male with right dominant, seen today pre-op for Cervical disc arthroplasty.  Pt  reports  right shoulder and bicep weakness going on approximately February 2023  Pt MRI and EMGs revealing upper extremity cervical radiculitis. Pt medical hx includes DM  type I. Pt last Hgb AIC (approximately July 2023)  Pt states his Symptoms occurred without any traumatic injury or fall, reports  right upper arm weakness, numbness and tingling of extremities. Pt reports prior conservative treatment of Physical therapy with mild improvement. Pt states right  upper arm pain aggravated with activity, mobility of extremity  no acute change in bowel and bladder habits. Scheduled for surgery on 9/6/23 with Dr. Neely.

## 2023-08-23 NOTE — H&P PST ADULT - ATTENDING COMMENTS
Attending statement:  I have personally seen this patient, and formed a face to face diagnostic evaluation on this patient on this date.  I have reviewed the PA, NP and or Medical/PA student and/or Resident documentation and agree with the history, physical exam and plan of care except if noted otherwise.      Patient has failed conservative care with regard to a C4-C5 disc herniation with resulting cervical radiculopathy based upon failed conservative care were electing to do a anterior cervical discectomy with a disc replacement.  All the patient's questions comments concerns been answered to his satisfaction based upon a poor quality of life intractable pain pelt failed conservative care he is clearly understanding of the risks of dysphagia dysphonia adjacent segment disease revision surgery we are going to proceed at the patient's wishes.  Consents have been obtained.

## 2023-08-23 NOTE — H&P PST ADULT - NSICDXPASTMEDICALHX_GEN_ALL_CORE_FT
PAST MEDICAL HISTORY:  Diabetes mellitus type 1     Other cervical disc displacement, unspecified cervical region      PAST MEDICAL HISTORY:  Diabetes mellitus type 1     Other cervical disc displacement, unspecified cervical region     Right fibular fracture

## 2023-08-23 NOTE — H&P PST ADULT - ASSESSMENT
Come with your insulin pump on you, bring extra supplies for 3 days, do not take bolus the morning of your surgery, unless otherwise instructed by your endocrinologist  Pt is a 38 years old male with right dominant, seen today pre-op for Cervical disc arthroplasty.  Pt  reports  right shoulder and bicep weakness going on approximately 2023  Pt MRI and EMGs revealing upper extremity cervical radiculitis. Pt medical hx includes DM  type I. Pt last Hgb AIC (approximately 2023)  Pt states his Symptoms occurred without any traumatic injury or fall, reports  right upper arm weakness, numbness and tingling of extremities. Pt reports prior conservative treatment of Physical therapy with mild improvement. Pt states right  upper arm pain aggravated with activity, mobility of extremity  no acute change in bowel and bladder habits. Scheduled for surgery on 23 with Dr. Neely. Surgery protocol reviewed with Pt today, Come with your insulin pump on you, bring extra supplies for 3 days, do not take bolus the morning of your surgery, unless otherwise instructed by your endocrinologist.   CAPRINI VTE 2.0 SCORE [CLOT updated 2019]    AGE RELATED RISK FACTORS                                                       MOBILITY RELATED FACTORS  [ ] Age 41-60 years                                            (1 Point)                    [ ] Bed rest                                                        (1 Point)  [ ] Age: 61-74 years                                           (2 Points)                  [ ] Plaster cast                                                   (2 Points)  [ ] Age= 75 years                                              (3 Points)                    [ ] Bed bound for more than 72 hours                 (2 Points)    DISEASE RELATED RISK FACTORS                                               GENDER SPECIFIC FACTORS  [ ] Edema in the lower extremities                       (1 Point)              [ ] Pregnancy                                                     (1 Point)  [ ] Varicose veins                                               (1 Point)                     [ ] Post-partum < 6 weeks                                   (1 Point)             [x ] BMI > 25 Kg/m2                                            (1 Point)                     [ ] Hormonal therapy  or oral contraception          (1 Point)                 [ ] Sepsis (in the previous month)                        (1 Point)               [ ] History of pregnancy complications                 (1 point)  [ ] Pneumonia or serious lung disease                                               [ ] Unexplained or recurrent                     (1 Point)           (in the previous month)                               (1 Point)  [ ] Abnormal pulmonary function test                     (1 Point)                 SURGERY RELATED RISK FACTORS  [ ] Acute myocardial infarction                              (1 Point)               [ ]  Section                                             (1 Point)  [ ] Congestive heart failure (in the previous month)  (1 Point)      [ ] Minor surgery                                                  (1 Point)   [ ] Inflammatory bowel disease                             (1 Point)               [ ] Arthroscopic surgery                                        (2 Points)  [ ] Central venous access                                      (2 Points)                [x ] General surgery lasting more than 45 minutes (2 points)  [ ] Malignancy- Present or previous                   (2 Points)                [ ] Elective arthroplasty                                         (5 points)    [ ] Stroke (in the previous month)                          (5 Points)                                                                                                                                                           HEMATOLOGY RELATED FACTORS                                                 TRAUMA RELATED RISK FACTORS  [ ] Prior episodes of VTE                                     (3 Points)                [ ] Fracture of the hip, pelvis, or leg                       (5 Points)  [ ] Positive family history for VTE                         (3 Points)             [ ] Acute spinal cord injury (in the previous month)  (5 Points)  [ ] Prothrombin 05335 A                                     (3 Points)               [ ] Paralysis  (less than 1 month)                             (5 Points)  [ ] Factor V Leiden                                             (3 Points)                  [ ] Multiple Trauma within 1 month                        (5 Points)  [ ] Lupus anticoagulants                                     (3 Points)                                                           [ ] Anticardiolipin antibodies                               (3 Points)                                                       [ ] High homocysteine in the blood                      (3 Points)                                             [ ] Other congenital or acquired thrombophilia      (3 Points)                                                [ ] Heparin induced thrombocytopenia                  (3 Points)                                     Total Score [       3   ]  OPIOID RISK TOOL    RENETTA EACH BOX THAT APPLIES AND ADD TOTALS AT THE END    FAMILY HISTORY OF SUBSTANCE ABUSE                 FEMALE         MALE                                                Alcohol                             [  ]1 pt          [  ]3pts                                               Illegal Durgs                     [  ]2 pts        [  ]3pts                                               Rx Drugs                           [  ]4 pts        [  ]4 pts    PERSONAL HISTORY OF SUBSTANCE ABUSE                                                                                          Alcohol                             [  ]3 pts       [  ]3 pts                                               Illegal Drugs                     [  ]4 pts        [  ]4 pts                                               Rx Drugs                           [  ]5 pts        [  ]5 pts    AGE BETWEEN 16-45 YEARS                                      [  ]1 pt         [  ]1 pt    HISTORY OF PREADOLESCENT   SEXUAL ABUSE                                                             [  ]3 pts        [  ]0pts    PSYCHOLOGICAL DISEASE                     ADD, OCD, Bipolar, Schizophrenia        [  ]2 pts         [  ]2 pts                      Depression                                               [  ]1 pt           [  ]1 pt           SCORING TOTAL   (add numbers and type here)              (*0**)                                     A score of 3 or lower indicated LOW risk for future opioid abuse  A score of 4 to 7 indicated moderate risk for future opioid abuse  A score of 8 or higher indicates a high risk for opioid abuse Pt is a 38 years old male with right dominant, seen today pre-op for Cervical disc arthroplasty.  Pt  reports  right shoulder and bicep weakness going on approximately 2023  Pt MRI and EMGs revealing upper extremity cervical radiculitis. Pt medical hx includes DM  type I. Pt last Hgb AIC (approximately 2023)  Pt states his Symptoms occurred without any traumatic injury or fall, reports  right upper arm weakness, numbness and tingling of extremities. Pt reports prior conservative treatment of Physical therapy with mild improvement. Pt states right  upper arm pain aggravated with activity, mobility of extremity  no acute change in bowel and bladder habits. Scheduled for surgery on 23 with Dr. Neely. Surgery protocol reviewed with Pt today, Come with your insulin pump on you, bring extra supplies for 3 days, do not take bolus the morning of your surgery, unless otherwise instructed by your endocrinologist. Pt to follow-up with PCP and endocrinologist for clearance, copies of Pt self assessment form emailed to [facility] CAPRINI VTE 2.0 SCORE [CLOT updated 2019]    AGE RELATED RISK FACTORS                                                       MOBILITY RELATED FACTORS  [ ] Age 41-60 years                                            (1 Point)                    [ ] Bed rest                                                        (1 Point)  [ ] Age: 61-74 years                                           (2 Points)                  [ ] Plaster cast                                                   (2 Points)  [ ] Age= 75 years                                              (3 Points)                    [ ] Bed bound for more than 72 hours                 (2 Points)    DISEASE RELATED RISK FACTORS                                               GENDER SPECIFIC FACTORS  [ ] Edema in the lower extremities                       (1 Point)              [ ] Pregnancy                                                     (1 Point)  [ ] Varicose veins                                               (1 Point)                     [ ] Post-partum < 6 weeks                                   (1 Point)             [x ] BMI > 25 Kg/m2                                            (1 Point)                     [ ] Hormonal therapy  or oral contraception          (1 Point)                 [ ] Sepsis (in the previous month)                        (1 Point)               [ ] History of pregnancy complications                 (1 point)  [ ] Pneumonia or serious lung disease                                               [ ] Unexplained or recurrent                     (1 Point)           (in the previous month)                               (1 Point)  [ ] Abnormal pulmonary function test                     (1 Point)                 SURGERY RELATED RISK FACTORS  [ ] Acute myocardial infarction                              (1 Point)               [ ]  Section                                             (1 Point)  [ ] Congestive heart failure (in the previous month)  (1 Point)      [ ] Minor surgery                                                  (1 Point)   [ ] Inflammatory bowel disease                             (1 Point)               [ ] Arthroscopic surgery                                        (2 Points)  [ ] Central venous access                                      (2 Points)                [x ] General surgery lasting more than 45 minutes (2 points)  [ ] Malignancy- Present or previous                   (2 Points)                [ ] Elective arthroplasty                                         (5 points)    [ ] Stroke (in the previous month)                          (5 Points)                                                                                                                                                           HEMATOLOGY RELATED FACTORS                                                 TRAUMA RELATED RISK FACTORS  [ ] Prior episodes of VTE                                     (3 Points)                [ ] Fracture of the hip, pelvis, or leg                       (5 Points)  [ ] Positive family history for VTE                         (3 Points)             [ ] Acute spinal cord injury (in the previous month)  (5 Points)  [ ] Prothrombin 35301 A                                     (3 Points)               [ ] Paralysis  (less than 1 month)                             (5 Points)  [ ] Factor V Leiden                                             (3 Points)                  [ ] Multiple Trauma within 1 month                        (5 Points)  [ ] Lupus anticoagulants                                     (3 Points)                                                           [ ] Anticardiolipin antibodies                               (3 Points)                                                       [ ] High homocysteine in the blood                      (3 Points)                                             [ ] Other congenital or acquired thrombophilia      (3 Points)                                                [ ] Heparin induced thrombocytopenia                  (3 Points)                                     Total Score [       3   ]  OPIOID RISK TOOL    RENETTA EACH BOX THAT APPLIES AND ADD TOTALS AT THE END    FAMILY HISTORY OF SUBSTANCE ABUSE                 FEMALE         MALE                                                Alcohol                             [  ]1 pt          [  ]3pts                                               Illegal Durgs                     [  ]2 pts        [  ]3pts                                               Rx Drugs                           [  ]4 pts        [  ]4 pts    PERSONAL HISTORY OF SUBSTANCE ABUSE                                                                                          Alcohol                             [  ]3 pts       [  ]3 pts                                               Illegal Drugs                     [  ]4 pts        [  ]4 pts                                               Rx Drugs                           [  ]5 pts        [  ]5 pts    AGE BETWEEN 16-45 YEARS                                      [  ]1 pt         [  ]1 pt    HISTORY OF PREADOLESCENT   SEXUAL ABUSE                                                             [  ]3 pts        [  ]0pts    PSYCHOLOGICAL DISEASE                     ADD, OCD, Bipolar, Schizophrenia        [  ]2 pts         [  ]2 pts                      Depression                                               [  ]1 pt           [  ]1 pt           SCORING TOTAL   (add numbers and type here)              (*0**)                                     A score of 3 or lower indicated LOW risk for future opioid abuse  A score of 4 to 7 indicated moderate risk for future opioid abuse  A score of 8 or higher indicates a high risk for opioid abuse

## 2023-08-23 NOTE — H&P PST ADULT - NSANTHOSAYNRD_GEN_A_CORE
No. ORALIA screening performed.  STOP BANG Legend: 0-2 = LOW Risk; 3-4 = INTERMEDIATE Risk; 5-8 = HIGH Risk

## 2023-08-23 NOTE — H&P PST ADULT - PROBLEM SELECTOR PLAN 2
Cervical disc arthroplasty follow-up with PCP and endocrinologist for evaluation and medical clearance prior to this surgery

## 2023-08-29 NOTE — DISCUSSION/SUMMARY
[Surgical risks reviewed] : Surgical risks reviewed [Other: ____] : in [unfilled] [de-identified] : A long discussion was had with the patient regarding Cervical surgical plan of focal disc arthroplasty C4-C5. Anatomic models, Xrays, CT scans/MRI's were utilized to provide a firm understanding of their surgical plan. Patient is aware that surgery is elective in nature and he/she choosing to proceed with surgery. Risks, benefits, alternatives were discussed and all questions, comments and concerns were encouraged and answered to the patient's satisfaction. The statistical probability of improvement was discussed at length as well as post surgical course. Literature from [place] [employer] spine society was provided to the patient regarding the specific type of surgery as well as a 5 page written surgical consent which the patient will need to sign and return to the office prior to surgical date. Consent forms highlight specific complications related to the complex nature of spinal surgery.   Risks of cervical surgery include: dysphagia/difficulty swallowing, Dysphonia/altered voice, adjacent segment disease (which will require more surgery in the future), vascular compromise and stroke, and persistent pain.   Benefits of cervical surgery include Improved neurologic function and pain score   We also discussed with the patient complications of incisions directly related to obesity, diabetes, previous wound complications or post-surgical wound infections, smoking, neuropathy, and chronic anticoagulation. This risk has been specifically discussed and the patient will discuss modifiable risk factors to be optimized prior to surgical management. A multimodality approach of primary care physician, and medicine subspecialists will be utilized to optimize medical risk factors.   If patient is a smoker, discontinuation of smoking was advised and must be accomplished 6-8 weeks prior to surgery date. Patient was advised that help with quitting smoking is available through [facility] Smoker's Quit Line and phone number/website was provided, or patient can ask assistance from primary care provider. Elective surgery will not be performed unless patient complies with this policy.  CT scan of the cervical spine is ordered is medically necessary for measuring cervical implants. Surgery needs to be done in a timely manner in order to reduce the probability of chronic weakness of the right upper extremity.  Patient is aware he may not have full resolution of right upper extremity weakness and that he will have to engage in rehabilitation in order to get the best results after surgery.  Patient does have hemoglobin A1c of 9.  I have asked for the assistance of our nurse navigator to expedite medical and endocrinology clearance and optimize hemoglobin A1c to his nearest 8 as possible.  Patient should have surgery within the next 4 weeks time.  Addendum August 29, 2023: Patient has hemoglobin A1c of 8.4 this is way better than it has been in the past.  He is trying very hard to control his blood sugar he is aware of the repercussions of high blood sugar in regard to upcoming surgery including but not limited to incisional dehiscence/poor wound healing, incisional infection, systemic infection, poor surgical outcome.  Patient has a large disc herniation with upper extremity weakness and decompression in a timely manner will give best chances of returning function to the patient.  Patient accepts the risks associated with surgery in light of his hemoglobin A1c/blood sugar test results.  He will continue to work on his diabetes management and we will proceed with surgery on the scheduled date of September 6, 2023

## 2023-08-29 NOTE — HISTORY OF PRESENT ILLNESS
[de-identified] : 38-year-old male is here for right shoulder and bicep weakness going on approximately 3 months.  He has been to the shoulder service who ordered MRI and EMGs revealing upper extremity cervical radiculitis.  Patient is a type I diabetic.  Symptoms occurred without precipitating injury.  He has been working out at the gym but is unable to continue lifting weights.  He has mild neck and right upper extremity pain.  Pain is exacerbated by activity in general made better by rest.  He is taking intermittent Tylenol.  Past medical surgical social family allergy history was reviewed.  Patient was referred by physiatry due to a large disc herniation.

## 2023-08-29 NOTE — PHYSICAL EXAM
[de-identified] : CONSTITUTIONAL:  Patient is a very pleasant individual who is well-nourished and appears stated age. \par PSYCHIATRIC:  Alert and oriented times three and in no apparent distress, and participates with orthopedic evaluation well.\par HEAD:  Atraumatic and  nonsyndromic in appearance.\par EENT: No thyromegaly, EOMI.\par RESPIRATORY:  Respiratory rate is regular, not dyspneic on examination.\par LYMPHATICS:  There is no cervical or axillary lymphadenopathy.\par INTEGUMENTARY:  Skin is clean, dry, and intact about the bilateral upper extremities and cervical spine. \par VASCULAR:   There is brisk capillary refill about the bilateral upper extremities and radial pulses are 2/4. \par NEUROLOGIC:  Negative L'hirmitte, positive right-sided Spurling's sign. There are no pathologic reflexes. There is a 5 distribution decrease in sensation of the right upper extremity on Wartenberg pinwheel examination.  Deep tendon reflexes are well-maintained at +2/4 of the bilateral upper extremities and are symmetric.\par MUSCULOSKELETAL:  There is no visible muscular atrophy.  Manual motor strength is well maintained in the bilateral upper extremities.  Cervical range of motion is well maintained.  The patient ambulates in a non-myelopathic manner. Normal secondary orthopaedic exam of bilateral shoulders, elbows and hands.  Elbow flexion and extension, wrist extension, finger flexion and abduction are well maintained on the left, right biceps shoulder AB duction 3 out of 5..\par \par   [de-identified] : X-rays have been reviewed on today's date of the cervical spine June 13, 2023 demonstrates mild loss of disc height at 4 5 and 5 6.  MRI of the cervical spine has been reviewed from May 2022 demonstrates a very large prominent 4 5 disc herniation on the patient's right as well as to a lesser extent C5-C6.

## 2023-09-01 PROBLEM — M50.20 OTHER CERVICAL DISC DISPLACEMENT, UNSPECIFIED CERVICAL REGION: Chronic | Status: ACTIVE | Noted: 2023-08-23

## 2023-09-05 ENCOUNTER — APPOINTMENT (OUTPATIENT)
Dept: CT IMAGING | Facility: CLINIC | Age: 38
End: 2023-09-05
Payer: MEDICAID

## 2023-09-05 ENCOUNTER — OUTPATIENT (OUTPATIENT)
Dept: OUTPATIENT SERVICES | Facility: HOSPITAL | Age: 38
LOS: 1 days | End: 2023-09-05
Payer: MEDICAID

## 2023-09-05 ENCOUNTER — TRANSCRIPTION ENCOUNTER (OUTPATIENT)
Age: 38
End: 2023-09-05

## 2023-09-05 DIAGNOSIS — Z00.8 ENCOUNTER FOR OTHER GENERAL EXAMINATION: ICD-10-CM

## 2023-09-05 DIAGNOSIS — Z98.890 OTHER SPECIFIED POSTPROCEDURAL STATES: Chronic | ICD-10-CM

## 2023-09-05 PROBLEM — S82.401A UNSPECIFIED FRACTURE OF SHAFT OF RIGHT FIBULA, INITIAL ENCOUNTER FOR CLOSED FRACTURE: Chronic | Status: ACTIVE | Noted: 2023-08-23

## 2023-09-05 PROCEDURE — 72125 CT NECK SPINE W/O DYE: CPT

## 2023-09-05 PROCEDURE — 72125 CT NECK SPINE W/O DYE: CPT | Mod: 26

## 2023-09-05 RX ORDER — APREPITANT 80 MG/1
40 CAPSULE ORAL ONCE
Refills: 0 | Status: COMPLETED | OUTPATIENT
Start: 2023-09-06 | End: 2023-09-06

## 2023-09-05 RX ORDER — CEFAZOLIN SODIUM 1 G
2000 VIAL (EA) INJECTION ONCE
Refills: 0 | Status: DISCONTINUED | OUTPATIENT
Start: 2023-09-06 | End: 2023-09-06

## 2023-09-05 RX ORDER — CELECOXIB 200 MG/1
200 CAPSULE ORAL ONCE
Refills: 0 | Status: COMPLETED | OUTPATIENT
Start: 2023-09-06 | End: 2023-09-06

## 2023-09-05 RX ORDER — ACETAMINOPHEN 500 MG
975 TABLET ORAL ONCE
Refills: 0 | Status: COMPLETED | OUTPATIENT
Start: 2023-09-06 | End: 2023-09-06

## 2023-09-05 NOTE — ASU PATIENT PROFILE, ADULT - NSICDXPASTMEDICALHX_GEN_ALL_CORE_FT
PAST MEDICAL HISTORY:  Diabetes mellitus type 1     Other cervical disc displacement, unspecified cervical region     Right fibular fracture

## 2023-09-06 ENCOUNTER — INPATIENT (INPATIENT)
Facility: HOSPITAL | Age: 38
LOS: 0 days | Discharge: HOME | DRG: 518 | End: 2023-09-07
Attending: ORTHOPAEDIC SURGERY | Admitting: ORTHOPAEDIC SURGERY
Payer: MEDICAID

## 2023-09-06 ENCOUNTER — APPOINTMENT (OUTPATIENT)
Dept: ORTHOPEDIC SURGERY | Facility: HOSPITAL | Age: 38
End: 2023-09-06

## 2023-09-06 ENCOUNTER — TRANSCRIPTION ENCOUNTER (OUTPATIENT)
Age: 38
End: 2023-09-06

## 2023-09-06 VITALS
RESPIRATION RATE: 16 BRPM | HEART RATE: 70 BPM | TEMPERATURE: 97 F | HEIGHT: 70 IN | DIASTOLIC BLOOD PRESSURE: 80 MMHG | SYSTOLIC BLOOD PRESSURE: 111 MMHG | OXYGEN SATURATION: 100 % | WEIGHT: 191.36 LBS

## 2023-09-06 DIAGNOSIS — Z98.890 OTHER SPECIFIED POSTPROCEDURAL STATES: Chronic | ICD-10-CM

## 2023-09-06 DIAGNOSIS — M50.20 OTHER CERVICAL DISC DISPLACEMENT, UNSPECIFIED CERVICAL REGION: ICD-10-CM

## 2023-09-06 LAB
GLUCOSE BLDC GLUCOMTR-MCNC: 180 MG/DL — HIGH (ref 70–99)
GLUCOSE BLDC GLUCOMTR-MCNC: 377 MG/DL — HIGH (ref 70–99)

## 2023-09-06 DEVICE — PIN CAP ACDF TAPERED: Type: IMPLANTABLE DEVICE | Status: FUNCTIONAL

## 2023-09-06 DEVICE — SURGIFLO MATRIX WITH THROMBIN KIT: Type: IMPLANTABLE DEVICE | Status: FUNCTIONAL

## 2023-09-06 DEVICE — SURGIFOAM PAD 8CM X 12.5CM X 10MM (100): Type: IMPLANTABLE DEVICE | Status: FUNCTIONAL

## 2023-09-06 DEVICE — IMPLANTABLE DEVICE: Type: IMPLANTABLE DEVICE | Status: FUNCTIONAL

## 2023-09-06 DEVICE — PIN ACDF 60 MM: Type: IMPLANTABLE DEVICE | Status: FUNCTIONAL

## 2023-09-06 RX ORDER — OXYCODONE HYDROCHLORIDE 5 MG/1
5 TABLET ORAL
Refills: 0 | Status: DISCONTINUED | OUTPATIENT
Start: 2023-09-06 | End: 2023-09-07

## 2023-09-06 RX ORDER — CEFAZOLIN SODIUM 1 G
2000 VIAL (EA) INJECTION
Refills: 0 | Status: DISCONTINUED | OUTPATIENT
Start: 2023-09-06 | End: 2023-09-06

## 2023-09-06 RX ORDER — SODIUM CHLORIDE 9 MG/ML
3 INJECTION INTRAMUSCULAR; INTRAVENOUS; SUBCUTANEOUS ONCE
Refills: 0 | Status: COMPLETED | OUTPATIENT
Start: 2023-09-06 | End: 2023-09-07

## 2023-09-06 RX ORDER — GLUCAGON INJECTION, SOLUTION 0.5 MG/.1ML
1 INJECTION, SOLUTION SUBCUTANEOUS ONCE
Refills: 0 | Status: DISCONTINUED | OUTPATIENT
Start: 2023-09-06 | End: 2023-09-07

## 2023-09-06 RX ORDER — MAGNESIUM HYDROXIDE 400 MG/1
30 TABLET, CHEWABLE ORAL EVERY 12 HOURS
Refills: 0 | Status: DISCONTINUED | OUTPATIENT
Start: 2023-09-06 | End: 2023-09-07

## 2023-09-06 RX ORDER — OXYCODONE HYDROCHLORIDE 5 MG/1
10 TABLET ORAL
Refills: 0 | Status: DISCONTINUED | OUTPATIENT
Start: 2023-09-06 | End: 2023-09-07

## 2023-09-06 RX ORDER — SODIUM CHLORIDE 9 MG/ML
1000 INJECTION, SOLUTION INTRAVENOUS
Refills: 0 | Status: DISCONTINUED | OUTPATIENT
Start: 2023-09-06 | End: 2023-09-07

## 2023-09-06 RX ORDER — PANTOPRAZOLE SODIUM 20 MG/1
40 TABLET, DELAYED RELEASE ORAL
Refills: 0 | Status: DISCONTINUED | OUTPATIENT
Start: 2023-09-06 | End: 2023-09-07

## 2023-09-06 RX ORDER — ASPIRIN/CALCIUM CARB/MAGNESIUM 324 MG
325 TABLET ORAL DAILY
Refills: 0 | Status: DISCONTINUED | OUTPATIENT
Start: 2023-09-07 | End: 2023-09-07

## 2023-09-06 RX ORDER — CELECOXIB 200 MG/1
200 CAPSULE ORAL EVERY 12 HOURS
Refills: 0 | Status: DISCONTINUED | OUTPATIENT
Start: 2023-09-06 | End: 2023-09-07

## 2023-09-06 RX ORDER — DEXTROSE 50 % IN WATER 50 %
15 SYRINGE (ML) INTRAVENOUS ONCE
Refills: 0 | Status: DISCONTINUED | OUTPATIENT
Start: 2023-09-06 | End: 2023-09-07

## 2023-09-06 RX ORDER — ONDANSETRON 8 MG/1
4 TABLET, FILM COATED ORAL EVERY 6 HOURS
Refills: 0 | Status: DISCONTINUED | OUTPATIENT
Start: 2023-09-06 | End: 2023-09-07

## 2023-09-06 RX ORDER — METOCLOPRAMIDE HCL 10 MG
10 TABLET ORAL ONCE
Refills: 0 | Status: DISCONTINUED | OUTPATIENT
Start: 2023-09-06 | End: 2023-09-07

## 2023-09-06 RX ORDER — LANOLIN ALCOHOL/MO/W.PET/CERES
3 CREAM (GRAM) TOPICAL AT BEDTIME
Refills: 0 | Status: DISCONTINUED | OUTPATIENT
Start: 2023-09-06 | End: 2023-09-07

## 2023-09-06 RX ORDER — METHOCARBAMOL 500 MG/1
750 TABLET, FILM COATED ORAL EVERY 8 HOURS
Refills: 0 | Status: DISCONTINUED | OUTPATIENT
Start: 2023-09-06 | End: 2023-09-07

## 2023-09-06 RX ORDER — DIAZEPAM 5 MG
2 TABLET ORAL ONCE
Refills: 0 | Status: DISCONTINUED | OUTPATIENT
Start: 2023-09-06 | End: 2023-09-07

## 2023-09-06 RX ORDER — METHOCARBAMOL 500 MG/1
750 TABLET, FILM COATED ORAL ONCE
Refills: 0 | Status: DISCONTINUED | OUTPATIENT
Start: 2023-09-06 | End: 2023-09-07

## 2023-09-06 RX ORDER — HYDROMORPHONE HYDROCHLORIDE 2 MG/ML
1 INJECTION INTRAMUSCULAR; INTRAVENOUS; SUBCUTANEOUS
Refills: 0 | Status: DISCONTINUED | OUTPATIENT
Start: 2023-09-06 | End: 2023-09-06

## 2023-09-06 RX ORDER — DEXTROSE 50 % IN WATER 50 %
12.5 SYRINGE (ML) INTRAVENOUS ONCE
Refills: 0 | Status: DISCONTINUED | OUTPATIENT
Start: 2023-09-06 | End: 2023-09-07

## 2023-09-06 RX ORDER — INSULIN LISPRO 100/ML
1 VIAL (ML) SUBCUTANEOUS
Refills: 0 | Status: DISCONTINUED | OUTPATIENT
Start: 2023-09-06 | End: 2023-09-07

## 2023-09-06 RX ORDER — CEFAZOLIN SODIUM 1 G
2000 VIAL (EA) INJECTION
Refills: 0 | Status: COMPLETED | OUTPATIENT
Start: 2023-09-06 | End: 2023-09-07

## 2023-09-06 RX ORDER — BENZOCAINE AND MENTHOL 5; 1 G/100ML; G/100ML
1 LIQUID ORAL
Refills: 0 | Status: DISCONTINUED | OUTPATIENT
Start: 2023-09-06 | End: 2023-09-07

## 2023-09-06 RX ORDER — SENNA PLUS 8.6 MG/1
2 TABLET ORAL AT BEDTIME
Refills: 0 | Status: DISCONTINUED | OUTPATIENT
Start: 2023-09-06 | End: 2023-09-07

## 2023-09-06 RX ORDER — ACETAMINOPHEN 500 MG
975 TABLET ORAL EVERY 8 HOURS
Refills: 0 | Status: DISCONTINUED | OUTPATIENT
Start: 2023-09-06 | End: 2023-09-07

## 2023-09-06 RX ORDER — HYDROMORPHONE HYDROCHLORIDE 2 MG/ML
0.5 INJECTION INTRAMUSCULAR; INTRAVENOUS; SUBCUTANEOUS
Refills: 0 | Status: DISCONTINUED | OUTPATIENT
Start: 2023-09-06 | End: 2023-09-07

## 2023-09-06 RX ORDER — DIAZEPAM 5 MG
2 TABLET ORAL EVERY 8 HOURS
Refills: 0 | Status: DISCONTINUED | OUTPATIENT
Start: 2023-09-06 | End: 2023-09-07

## 2023-09-06 RX ORDER — DEXTROSE 50 % IN WATER 50 %
25 SYRINGE (ML) INTRAVENOUS ONCE
Refills: 0 | Status: DISCONTINUED | OUTPATIENT
Start: 2023-09-06 | End: 2023-09-07

## 2023-09-06 RX ORDER — HYDROMORPHONE HYDROCHLORIDE 2 MG/ML
0.5 INJECTION INTRAMUSCULAR; INTRAVENOUS; SUBCUTANEOUS
Refills: 0 | Status: DISCONTINUED | OUTPATIENT
Start: 2023-09-06 | End: 2023-09-06

## 2023-09-06 RX ORDER — INFLUENZA VIRUS VACCINE 15; 15; 15; 15 UG/.5ML; UG/.5ML; UG/.5ML; UG/.5ML
0.5 SUSPENSION INTRAMUSCULAR ONCE
Refills: 0 | Status: DISCONTINUED | OUTPATIENT
Start: 2023-09-06 | End: 2023-09-07

## 2023-09-06 RX ORDER — INSULIN LISPRO 100/ML
VIAL (ML) SUBCUTANEOUS
Refills: 0 | Status: DISCONTINUED | OUTPATIENT
Start: 2023-09-06 | End: 2023-09-06

## 2023-09-06 RX ORDER — SODIUM CHLORIDE 9 MG/ML
1000 INJECTION, SOLUTION INTRAVENOUS
Refills: 0 | Status: DISCONTINUED | OUTPATIENT
Start: 2023-09-06 | End: 2023-09-06

## 2023-09-06 RX ADMIN — HYDROMORPHONE HYDROCHLORIDE 1 MILLIGRAM(S): 2 INJECTION INTRAMUSCULAR; INTRAVENOUS; SUBCUTANEOUS at 18:05

## 2023-09-06 RX ADMIN — METHOCARBAMOL 750 MILLIGRAM(S): 500 TABLET, FILM COATED ORAL at 22:56

## 2023-09-06 RX ADMIN — Medication 2000 MILLIGRAM(S): at 22:57

## 2023-09-06 RX ADMIN — SODIUM CHLORIDE 75 MILLILITER(S): 9 INJECTION, SOLUTION INTRAVENOUS at 17:56

## 2023-09-06 RX ADMIN — SENNA PLUS 2 TABLET(S): 8.6 TABLET ORAL at 22:57

## 2023-09-06 RX ADMIN — OXYCODONE HYDROCHLORIDE 10 MILLIGRAM(S): 5 TABLET ORAL at 23:03

## 2023-09-06 RX ADMIN — Medication 975 MILLIGRAM(S): at 23:56

## 2023-09-06 RX ADMIN — CELECOXIB 200 MILLIGRAM(S): 200 CAPSULE ORAL at 13:10

## 2023-09-06 RX ADMIN — HYDROMORPHONE HYDROCHLORIDE 1 MILLIGRAM(S): 2 INJECTION INTRAMUSCULAR; INTRAVENOUS; SUBCUTANEOUS at 17:55

## 2023-09-06 RX ADMIN — Medication 975 MILLIGRAM(S): at 22:56

## 2023-09-06 RX ADMIN — Medication 975 MILLIGRAM(S): at 13:11

## 2023-09-06 RX ADMIN — APREPITANT 40 MILLIGRAM(S): 80 CAPSULE ORAL at 13:11

## 2023-09-06 NOTE — BRIEF OPERATIVE NOTE - NSICDXBRIEFPROCEDURE_GEN_ALL_CORE_FT
PROCEDURES:  Replacement of cervical spinal disc, NOS 06-Sep-2023 17:05:36  Juma Neely  
PROCEDURES:  Replacement of cervical spinal disc, NOS 06-Sep-2023 17:05:36  Juma Neely

## 2023-09-06 NOTE — DISCHARGE NOTE PROVIDER - NSDCCPCAREPLAN_GEN_ALL_CORE_FT
PRINCIPAL DISCHARGE DIAGNOSIS  Diagnosis: Cervical radiculopathy  Assessment and Plan of Treatment:

## 2023-09-06 NOTE — DISCHARGE NOTE PROVIDER - NSDCFUADDINST_GEN_ALL_CORE_FT
Leave occlusive dressing on wound for one week. You may then remove it and leave open to air. Protect while showering. Leave steri strips intact, they will fall off on their own or be removed on first post op visit. Patient to call office to make post op appointment. Wear cervical collar when out of bed for comfort, especially when you are passenger in a car, may take off for meals & showering.  Physical therapy will be prescribed on second office visit.  For now, engage in light activity as tolerated, no lifting greater than 5 lb.  No driving while on pain meds and must wear cervical collar when in a vehicle for 28 days. Patient is to take Aspirin 325 once daily for 28 days for blood clot prevention

## 2023-09-06 NOTE — PROGRESS NOTE ADULT - SUBJECTIVE AND OBJECTIVE BOX
MARIALUISA [facility]  [id number]  38yMale    STATUS POST: cervical disc arthroplasty C4-c5 for cervical stenosis, cervical HNP with   RIGHT  upper extremity radiculitis.    Other herniation of intervertebral disc of cervical spine    No pertinent family history in first degree relatives    FH: type 1 diabetes mellitus (Father)    Handoff    Diabetes mellitus type 1    Other cervical disc displacement, unspecified cervical region    Right fibular fracture    Cervical spondylosis with radiculopathy    Cervical spondylosis with radiculopathy    Anterior cervical fusion using plate    Replacement of cervical spinal disc, NOS    No significant past surgical history    S/P ORIF (open reduction internal fixation) fracture    [id number]        SUBJECTIVE: Patient seen and examined doing well  Pain controlled, positive posterior neck pain as expected     OBJECTIVE:   T(C): 36.2 (09-06-23 @ 12:47), Max: 36.2 (09-06-23 @ 12:47)  HR: 70 (09-06-23 @ 12:47) (70 - 70)  BP: 111/80 (09-06-23 @ 12:47) (111/80 - 111/80)  RR: 16 (09-06-23 @ 12:47) (16 - 16)  SpO2: 100% (09-06-23 @ 12:47) (100% - 100%)  Constitutional: Pleasant in no acute distress  Psych:A&Ox3  EENT: no dysphonia, no dyspnea.  mild dysphagia as expected  Abdominal: soft and supple non distended  Lymphatics: no pretibial pitting edema  Spine:          Dressing:  clean/dry/intact, no drain               Sensation:          Upper extremity          grossly intact manually,     distribution paresthesia on the right   much improved          Lower extremity           grossly intact manually                               Motor:                   Lower and upper extremity grossly intact manually, positive posterior cervicalgia as expected            Vascular:[x] warm well perfused; capillary refill <3 seconds                A/P :38y Male S/P cervical disc arthroplasty as above  POD#0  -    Pain control- multimodal approach.   NSAIDS are ok to use.   Focus on muscle relaxers, heat.   -    DVT ppx: [ x]SCDs, early ambulation,  [id number] mg daily x 28 days post op  -    Periop abx:  Ancef [x ]    -    Likely 23 hour admission  -    Change dressing on post op visit and prn   -    Resume home meds as appropriate  -PT /OT WBAT, balance and gait  -brace cervical collar with OOB is for comfort and not mandatory, never to be worn in bed, with eating or hygeine but should be worn when in a motor vehicle x 28 days post op.   -medical follow up Hospitalist Dr Ellison  - IDDM- consistent carbohydrate diet.  sliding scale coverage, use insulin pump as prior. nutrition consult  - patient should be reassured that it is normal to have dysphagia post operative, encourage soft diet, cutting food in small pieces.  cepacol losenges ordered

## 2023-09-06 NOTE — BRIEF OPERATIVE NOTE - OPERATION/FINDINGS
Very large C4-C5 disc herniation right greater than the left
I assisted all aspects of operative positioning including placing shoulder bolster, taping of shoulders in a slightly dependent position, maintenance of head and an extended position. I obtained fluoroscopic imaging confirming the appropriate visualization of levels.  I participated in operative time out.   I cleansed the operative area with Betadine and RN then prepped with DuraPrep. I participated in draping with blue drapes and ioban and then ensured that drapes were appropriately positioned.I assisted with surgical exposure to the pretracheal prevertebral fascia using toothless pickups and then Cloward retractors during which time I assisted with maintenance of hemostasis with Surgi-Taiwo, Ray-Nidia, persistent suction, intermittent irrigation. I assisted with sequential placement of [place] pins at level and level. Annulotomy was performed at level and level using a Bovie cautery as well as a 15 blade. I assisted with discectomy procedure at C4-C5, by using pituitary, micropituitary and suction.  Assisted with placement of disc arthroplasty anterior cervical spine by using Cloward retraction, intermittent irrigation, consistent suction. Copious irrigation was then used, final hemostasis was performed with FloSeal, Ray-Nidia, and bipolar cautery. Fluoroscopic imaging confirmed appropriate placement of implants. I confirmed with operating surgeon and neuro monitoring that final neuro monitoring was stable. 10 round PANTERA drain was placed and  Closure was performed platysma with 2-0 vicryl, superficial fascia with 3-0 vicryl and skin with 4-0 MONOCRYL.

## 2023-09-06 NOTE — BRIEF OPERATIVE NOTE - NSICDXBRIEFPOSTOP_GEN_ALL_CORE_FT
POST-OP DIAGNOSIS:  Cervical spondylosis with radiculopathy 06-Sep-2023 17:06:09  Juma Neely  
POST-OP DIAGNOSIS:  Cervical spondylosis with radiculopathy 06-Sep-2023 17:06:09  Juma Neely

## 2023-09-06 NOTE — BRIEF OPERATIVE NOTE - NSICDXBRIEFPREOP_GEN_ALL_CORE_FT
PRE-OP DIAGNOSIS:  Cervical spondylosis with radiculopathy 06-Sep-2023 17:05:57  Juma Neely  
PRE-OP DIAGNOSIS:  Cervical spondylosis with radiculopathy 06-Sep-2023 17:05:57  Juma Neely

## 2023-09-06 NOTE — PATIENT PROFILE ADULT - NSFALLSECTIONLABEL_GEN_A_CORE
What Type Of Note Output Would You Prefer (Optional)?: Standard Output How Severe Are Your Spot(S)?: mild Have Your Spot(S) Been Treated In The Past?: has not been treated Hpi Title: Evaluation of Skin Lesions .

## 2023-09-06 NOTE — DISCHARGE NOTE PROVIDER - NSDCCPTREATMENT_GEN_ALL_CORE_FT
PRINCIPAL PROCEDURE  Procedure: Replacement of cervical spinal disc, NOS  Findings and Treatment:

## 2023-09-06 NOTE — DISCHARGE NOTE PROVIDER - CARE PROVIDER_API CALL
Juma Neely  Orthopaedic Surgery  [address] [place] [id number]  Phone: [phone]  Fax: [phone]  Follow Up Time:

## 2023-09-06 NOTE — DISCHARGE NOTE PROVIDER - HOSPITAL COURSE
Patient was admitted for Cervical disc arthroplasty C4-5. The post operative course was uneventful.  PT/OT Worked with patient for acute rehabilitation process. The pain was adequately controlled and patient is able to go home as she can accomplish ADL with help from family member at this time.  The cervical collar was worn for comfort when out of bed.

## 2023-09-06 NOTE — DISCHARGE NOTE PROVIDER - NSDCMRMEDTOKEN_GEN_ALL_CORE_FT
insulin pump:   mupirocin 2% topical ointment: 1 application in each affected nostril 2 times a day start medication 9/1-9/5/23 apply both nostrils BID x 5 days  Naprosyn 500 mg oral tablet: 1 orally prn  Testosterone supplements:   Vitamin D3 1250 mcg (50,000 intl units) oral capsule: 1 orally once a week   acetaminophen 325 mg oral tablet: 3 tab(s) orally every 8 hours  aspirin 325 mg oral tablet: 1 tab(s) orally once a day  aspirin 325 mg oral tablet: 1 tab(s) orally once a day  methocarbamol 750 mg oral tablet: 1 tab(s) orally every 8 hours x 5 days as needed for  muscle spasm  oxyCODONE 5 mg oral tablet: 1 tab(s) orally every 4 hours MDD: 6  senna leaf extract oral tablet: 2 tab(s) orally once a day (at bedtime)

## 2023-09-06 NOTE — PATIENT PROFILE ADULT - FALL HARM RISK - HARM RISK INTERVENTIONS

## 2023-09-07 ENCOUNTER — TRANSCRIPTION ENCOUNTER (OUTPATIENT)
Age: 38
End: 2023-09-07

## 2023-09-07 VITALS
OXYGEN SATURATION: 96 % | DIASTOLIC BLOOD PRESSURE: 71 MMHG | RESPIRATION RATE: 17 BRPM | SYSTOLIC BLOOD PRESSURE: 118 MMHG | TEMPERATURE: 98 F | HEART RATE: 96 BPM

## 2023-09-07 LAB
ACETONE SERPL-MCNC: NEGATIVE — SIGNIFICANT CHANGE UP
ALBUMIN SERPL ELPH-MCNC: 3.5 G/DL — SIGNIFICANT CHANGE UP (ref 3.3–5.2)
ALP SERPL-CCNC: 99 U/L — SIGNIFICANT CHANGE UP (ref 40–120)
ALT FLD-CCNC: 12 U/L — SIGNIFICANT CHANGE UP
ANION GAP SERPL CALC-SCNC: 12 MMOL/L — SIGNIFICANT CHANGE UP (ref 5–17)
ANION GAP SERPL CALC-SCNC: 14 MMOL/L — SIGNIFICANT CHANGE UP (ref 5–17)
APPEARANCE UR: CLEAR — SIGNIFICANT CHANGE UP
AST SERPL-CCNC: 17 U/L — SIGNIFICANT CHANGE UP
B-OH-BUTYR SERPL-SCNC: 0.1 MMOL/L — SIGNIFICANT CHANGE UP
BASE EXCESS BLDV CALC-SCNC: -1 MMOL/L — SIGNIFICANT CHANGE UP (ref -2–3)
BILIRUB SERPL-MCNC: 0.2 MG/DL — LOW (ref 0.4–2)
BILIRUB UR-MCNC: NEGATIVE — SIGNIFICANT CHANGE UP
BUN SERPL-MCNC: 12.3 MG/DL — SIGNIFICANT CHANGE UP (ref 8–20)
BUN SERPL-MCNC: 12.6 MG/DL — SIGNIFICANT CHANGE UP (ref 8–20)
CA-I SERPL-SCNC: 1.19 MMOL/L — SIGNIFICANT CHANGE UP (ref 1.15–1.33)
CALCIUM SERPL-MCNC: 8.6 MG/DL — SIGNIFICANT CHANGE UP (ref 8.4–10.5)
CALCIUM SERPL-MCNC: 8.7 MG/DL — SIGNIFICANT CHANGE UP (ref 8.4–10.5)
CHLORIDE BLDV-SCNC: 100 MMOL/L — SIGNIFICANT CHANGE UP (ref 96–108)
CHLORIDE SERPL-SCNC: 100 MMOL/L — SIGNIFICANT CHANGE UP (ref 96–108)
CHLORIDE SERPL-SCNC: 98 MMOL/L — SIGNIFICANT CHANGE UP (ref 96–108)
CO2 SERPL-SCNC: 21 MMOL/L — LOW (ref 22–29)
CO2 SERPL-SCNC: 23 MMOL/L — SIGNIFICANT CHANGE UP (ref 22–29)
COLOR SPEC: YELLOW — SIGNIFICANT CHANGE UP
CREAT SERPL-MCNC: 1.01 MG/DL — SIGNIFICANT CHANGE UP (ref 0.5–1.3)
CREAT SERPL-MCNC: 1.02 MG/DL — SIGNIFICANT CHANGE UP (ref 0.5–1.3)
DIFF PNL FLD: NEGATIVE — SIGNIFICANT CHANGE UP
EGFR: 96 ML/MIN/1.73M2 — SIGNIFICANT CHANGE UP
EGFR: 97 ML/MIN/1.73M2 — SIGNIFICANT CHANGE UP
GAS PNL BLDV: 132 MMOL/L — LOW (ref 136–145)
GAS PNL BLDV: SIGNIFICANT CHANGE UP
GAS PNL BLDV: SIGNIFICANT CHANGE UP
GLUCOSE BLDC GLUCOMTR-MCNC: 120 MG/DL — HIGH (ref 70–99)
GLUCOSE BLDC GLUCOMTR-MCNC: 310 MG/DL — HIGH (ref 70–99)
GLUCOSE BLDC GLUCOMTR-MCNC: 370 MG/DL — HIGH (ref 70–99)
GLUCOSE BLDC GLUCOMTR-MCNC: >530 MG/DL — CRITICAL HIGH (ref 70–99)
GLUCOSE BLDC GLUCOMTR-MCNC: >530 MG/DL — CRITICAL HIGH (ref 70–99)
GLUCOSE BLDV-MCNC: 548 MG/DL — CRITICAL HIGH (ref 70–99)
GLUCOSE SERPL-MCNC: 490 MG/DL — CRITICAL HIGH (ref 70–99)
GLUCOSE SERPL-MCNC: 646 MG/DL — CRITICAL HIGH (ref 70–99)
GLUCOSE UR QL: 1000 MG/DL
HCO3 BLDV-SCNC: 24 MMOL/L — SIGNIFICANT CHANGE UP (ref 22–29)
HCT VFR BLD CALC: 33.6 % — LOW (ref 39–50)
HCT VFR BLD CALC: 34.1 % — LOW (ref 39–50)
HCT VFR BLDA CALC: 35 % — SIGNIFICANT CHANGE UP
HGB BLD CALC-MCNC: 11.8 G/DL — LOW (ref 12.6–17.4)
HGB BLD-MCNC: 11.1 G/DL — LOW (ref 13–17)
HGB BLD-MCNC: 11.3 G/DL — LOW (ref 13–17)
KETONES UR-MCNC: NEGATIVE — SIGNIFICANT CHANGE UP
LACTATE BLDV-MCNC: 2.6 MMOL/L — HIGH (ref 0.5–2)
LACTATE SERPL-SCNC: 1.6 MMOL/L — SIGNIFICANT CHANGE UP (ref 0.5–2)
LEUKOCYTE ESTERASE UR-ACNC: NEGATIVE — SIGNIFICANT CHANGE UP
MAGNESIUM SERPL-MCNC: 1.9 MG/DL — SIGNIFICANT CHANGE UP (ref 1.6–2.6)
MCHC RBC-ENTMCNC: 28 PG — SIGNIFICANT CHANGE UP (ref 27–34)
MCHC RBC-ENTMCNC: 28 PG — SIGNIFICANT CHANGE UP (ref 27–34)
MCHC RBC-ENTMCNC: 33 GM/DL — SIGNIFICANT CHANGE UP (ref 32–36)
MCHC RBC-ENTMCNC: 33.1 GM/DL — SIGNIFICANT CHANGE UP (ref 32–36)
MCV RBC AUTO: 84.6 FL — SIGNIFICANT CHANGE UP (ref 80–100)
MCV RBC AUTO: 84.6 FL — SIGNIFICANT CHANGE UP (ref 80–100)
NITRITE UR-MCNC: NEGATIVE — SIGNIFICANT CHANGE UP
PCO2 BLDV: 42 MMHG — SIGNIFICANT CHANGE UP (ref 42–55)
PH BLDV: 7.37 — SIGNIFICANT CHANGE UP (ref 7.32–7.43)
PH UR: 7 — SIGNIFICANT CHANGE UP (ref 5–8)
PHOSPHATE SERPL-MCNC: 3.4 MG/DL — SIGNIFICANT CHANGE UP (ref 2.4–4.7)
PLATELET # BLD AUTO: 159 K/UL — SIGNIFICANT CHANGE UP (ref 150–400)
PLATELET # BLD AUTO: 160 K/UL — SIGNIFICANT CHANGE UP (ref 150–400)
PO2 BLDV: 53 MMHG — HIGH (ref 25–45)
POTASSIUM BLDV-SCNC: 4.7 MMOL/L — SIGNIFICANT CHANGE UP (ref 3.5–5.1)
POTASSIUM SERPL-MCNC: 4.7 MMOL/L — SIGNIFICANT CHANGE UP (ref 3.5–5.3)
POTASSIUM SERPL-MCNC: 4.8 MMOL/L — SIGNIFICANT CHANGE UP (ref 3.5–5.3)
POTASSIUM SERPL-SCNC: 4.7 MMOL/L — SIGNIFICANT CHANGE UP (ref 3.5–5.3)
POTASSIUM SERPL-SCNC: 4.8 MMOL/L — SIGNIFICANT CHANGE UP (ref 3.5–5.3)
PROT SERPL-MCNC: 6.1 G/DL — LOW (ref 6.6–8.7)
PROT UR-MCNC: NEGATIVE — SIGNIFICANT CHANGE UP
RBC # BLD: 3.97 M/UL — LOW (ref 4.2–5.8)
RBC # BLD: 4.03 M/UL — LOW (ref 4.2–5.8)
RBC # FLD: 11.7 % — SIGNIFICANT CHANGE UP (ref 10.3–14.5)
RBC # FLD: 11.8 % — SIGNIFICANT CHANGE UP (ref 10.3–14.5)
SAO2 % BLDV: 88.1 % — SIGNIFICANT CHANGE UP
SODIUM SERPL-SCNC: 133 MMOL/L — LOW (ref 135–145)
SODIUM SERPL-SCNC: 134 MMOL/L — LOW (ref 135–145)
SP GR SPEC: 1.01 — SIGNIFICANT CHANGE UP (ref 1.01–1.02)
UROBILINOGEN FLD QL: NEGATIVE MG/DL — SIGNIFICANT CHANGE UP
WBC # BLD: 8.75 K/UL — SIGNIFICANT CHANGE UP (ref 3.8–10.5)
WBC # BLD: 9.01 K/UL — SIGNIFICANT CHANGE UP (ref 3.8–10.5)
WBC # FLD AUTO: 8.75 K/UL — SIGNIFICANT CHANGE UP (ref 3.8–10.5)
WBC # FLD AUTO: 9.01 K/UL — SIGNIFICANT CHANGE UP (ref 3.8–10.5)

## 2023-09-07 PROCEDURE — 85027 COMPLETE CBC AUTOMATED: CPT

## 2023-09-07 PROCEDURE — C1889: CPT

## 2023-09-07 PROCEDURE — 85018 HEMOGLOBIN: CPT

## 2023-09-07 PROCEDURE — 84100 ASSAY OF PHOSPHORUS: CPT

## 2023-09-07 PROCEDURE — 83735 ASSAY OF MAGNESIUM: CPT

## 2023-09-07 PROCEDURE — 82803 BLOOD GASES ANY COMBINATION: CPT

## 2023-09-07 PROCEDURE — 81003 URINALYSIS AUTO W/O SCOPE: CPT

## 2023-09-07 PROCEDURE — 82435 ASSAY OF BLOOD CHLORIDE: CPT

## 2023-09-07 PROCEDURE — 97167 OT EVAL HIGH COMPLEX 60 MIN: CPT

## 2023-09-07 PROCEDURE — 84132 ASSAY OF SERUM POTASSIUM: CPT

## 2023-09-07 PROCEDURE — 82962 GLUCOSE BLOOD TEST: CPT

## 2023-09-07 PROCEDURE — 93010 ELECTROCARDIOGRAM REPORT: CPT

## 2023-09-07 PROCEDURE — 85014 HEMATOCRIT: CPT

## 2023-09-07 PROCEDURE — C1713: CPT

## 2023-09-07 PROCEDURE — 99223 1ST HOSP IP/OBS HIGH 75: CPT

## 2023-09-07 PROCEDURE — 82009 KETONE BODYS QUAL: CPT

## 2023-09-07 PROCEDURE — 80048 BASIC METABOLIC PNL TOTAL CA: CPT

## 2023-09-07 PROCEDURE — 83605 ASSAY OF LACTIC ACID: CPT

## 2023-09-07 PROCEDURE — 93005 ELECTROCARDIOGRAM TRACING: CPT

## 2023-09-07 PROCEDURE — 82330 ASSAY OF CALCIUM: CPT

## 2023-09-07 PROCEDURE — 80053 COMPREHEN METABOLIC PANEL: CPT

## 2023-09-07 PROCEDURE — 82010 KETONE BODYS QUAN: CPT

## 2023-09-07 PROCEDURE — 84295 ASSAY OF SERUM SODIUM: CPT

## 2023-09-07 PROCEDURE — 82947 ASSAY GLUCOSE BLOOD QUANT: CPT

## 2023-09-07 PROCEDURE — 36415 COLL VENOUS BLD VENIPUNCTURE: CPT

## 2023-09-07 RX ORDER — CHOLECALCIFEROL (VITAMIN D3) 125 MCG
1 CAPSULE ORAL
Refills: 0 | DISCHARGE

## 2023-09-07 RX ORDER — SENNA PLUS 8.6 MG/1
2 TABLET ORAL
Qty: 0 | Refills: 0 | DISCHARGE
Start: 2023-09-07

## 2023-09-07 RX ORDER — SODIUM CHLORIDE 9 MG/ML
1000 INJECTION, SOLUTION INTRAVENOUS
Refills: 0 | Status: DISCONTINUED | OUTPATIENT
Start: 2023-09-07 | End: 2023-09-07

## 2023-09-07 RX ORDER — INSULIN LISPRO 100/ML
VIAL (ML) SUBCUTANEOUS EVERY 4 HOURS
Refills: 0 | Status: DISCONTINUED | OUTPATIENT
Start: 2023-09-07 | End: 2023-09-07

## 2023-09-07 RX ORDER — INSULIN HUMAN 100 [IU]/ML
0 INJECTION, SOLUTION SUBCUTANEOUS
Refills: 0 | DISCHARGE

## 2023-09-07 RX ORDER — ACETAMINOPHEN 500 MG
3 TABLET ORAL
Qty: 0 | Refills: 0 | DISCHARGE
Start: 2023-09-07

## 2023-09-07 RX ORDER — SODIUM CHLORIDE 9 MG/ML
1000 INJECTION INTRAMUSCULAR; INTRAVENOUS; SUBCUTANEOUS ONCE
Refills: 0 | Status: COMPLETED | OUTPATIENT
Start: 2023-09-07 | End: 2023-09-07

## 2023-09-07 RX ORDER — OXYCODONE HYDROCHLORIDE 5 MG/1
1 TABLET ORAL
Qty: 30 | Refills: 0
Start: 2023-09-07 | End: 2023-09-11

## 2023-09-07 RX ORDER — ASPIRIN/CALCIUM CARB/MAGNESIUM 324 MG
1 TABLET ORAL
Qty: 28 | Refills: 0
Start: 2023-09-07 | End: 2023-10-04

## 2023-09-07 RX ORDER — METHOCARBAMOL 500 MG/1
1 TABLET, FILM COATED ORAL
Qty: 15 | Refills: 0
Start: 2023-09-07 | End: 2023-09-11

## 2023-09-07 RX ADMIN — OXYCODONE HYDROCHLORIDE 5 MILLIGRAM(S): 5 TABLET ORAL at 12:27

## 2023-09-07 RX ADMIN — CELECOXIB 200 MILLIGRAM(S): 200 CAPSULE ORAL at 05:07

## 2023-09-07 RX ADMIN — Medication 975 MILLIGRAM(S): at 14:48

## 2023-09-07 RX ADMIN — OXYCODONE HYDROCHLORIDE 10 MILLIGRAM(S): 5 TABLET ORAL at 00:03

## 2023-09-07 RX ADMIN — METHOCARBAMOL 750 MILLIGRAM(S): 500 TABLET, FILM COATED ORAL at 14:48

## 2023-09-07 RX ADMIN — Medication 325 MILLIGRAM(S): at 12:19

## 2023-09-07 RX ADMIN — Medication 975 MILLIGRAM(S): at 05:08

## 2023-09-07 RX ADMIN — Medication 975 MILLIGRAM(S): at 06:08

## 2023-09-07 RX ADMIN — OXYCODONE HYDROCHLORIDE 10 MILLIGRAM(S): 5 TABLET ORAL at 04:54

## 2023-09-07 RX ADMIN — Medication 2000 MILLIGRAM(S): at 06:18

## 2023-09-07 RX ADMIN — SODIUM CHLORIDE 150 MILLILITER(S): 9 INJECTION, SOLUTION INTRAVENOUS at 06:18

## 2023-09-07 RX ADMIN — OXYCODONE HYDROCHLORIDE 10 MILLIGRAM(S): 5 TABLET ORAL at 05:54

## 2023-09-07 RX ADMIN — SODIUM CHLORIDE 1000 MILLILITER(S): 9 INJECTION INTRAMUSCULAR; INTRAVENOUS; SUBCUTANEOUS at 05:24

## 2023-09-07 RX ADMIN — SODIUM CHLORIDE 1000 MILLILITER(S): 9 INJECTION INTRAMUSCULAR; INTRAVENOUS; SUBCUTANEOUS at 06:44

## 2023-09-07 RX ADMIN — CELECOXIB 200 MILLIGRAM(S): 200 CAPSULE ORAL at 06:07

## 2023-09-07 RX ADMIN — SODIUM CHLORIDE 3 MILLILITER(S): 9 INJECTION INTRAMUSCULAR; INTRAVENOUS; SUBCUTANEOUS at 05:15

## 2023-09-07 RX ADMIN — METHOCARBAMOL 750 MILLIGRAM(S): 500 TABLET, FILM COATED ORAL at 05:08

## 2023-09-07 RX ADMIN — PANTOPRAZOLE SODIUM 40 MILLIGRAM(S): 20 TABLET, DELAYED RELEASE ORAL at 05:08

## 2023-09-07 NOTE — PHYSICAL THERAPY INITIAL EVALUATION ADULT - ADDITIONAL COMMENTS
Pt reports independent PTA, owns no DME. +, independent ADLs. Wife can assist as needed. Pt will stay on first floor upon discharge

## 2023-09-07 NOTE — CONSULT NOTE ADULT - SUBJECTIVE AND OBJECTIVE BOX
Hospitalist Medicine - Consult Note    CC: Cervical Pain  HPI/Hospital Course:   38M with PMHX IDDM1 on Insulin Pump, Cervical Radiculopathy admitted to Orthopedic Surgery on 9/6/23 now s/p Cervical Disk Arthroplasty C4-C5. Patient noted to have elevated FS BS overnight by Orthopedic Surgery. Hospitalist Medicine consulted for co-mgmt. FS BS >530 x2. Most recent BMP Glucose 646 with developing metabolic acidosis. Pt seen/examined. Pt admits to snacking post-operatively during the day and not using his insulin pump with mealtime coverage as he should. C/o post-op pain otherwise denies abd pain, n/v/d, HA, or any other complaints. Patient reports bolusing himself 11.5 units approx 1 hour prior to our conversation for coverage. He is very concerned about overtreating his diabetes with insulin and becoming hypogylcemic. Explained need for stat labwork, close monitoring of accuechecks, and intervention for uncontrolled DM. Patient agrees to labwork and fluids.     ROS negaitve unless mentioned.    PMHX: IDDM1 on Insulin pump, Cervical Radiculopathy  PSHX: ORIF, Cervical Arthroplasty C4-C5  FamHx: +Father DM1  Social hx: Denies etoh/tobacco/drug abuse  NKDA    Vital Signs Last 24 Hrs  T(C): 36.6 (07 Sep 2023 05:00), Max: 37.1 (06 Sep 2023 17:25)  T(F): 97.8 (07 Sep 2023 05:00), Max: 98.7 (06 Sep 2023 17:25)  HR: 108 (07 Sep 2023 05:00) (70 - 112)  BP: 131/74 (07 Sep 2023 05:00) (110/70 - 140/80)  BP(mean): 102 (06 Sep 2023 19:25) (71 - 102)  RR: 18 (07 Sep 2023 05:00) (14 - 20)  SpO2: 95% (07 Sep 2023 05:00) (94% - 100%)    Parameters below as of 07 Sep 2023 05:00  Patient On (Oxygen Delivery Method): room air    Constitutional: NAD, VSS  Head: NC/AT  Eyes: PERRL, EOMI, anicteric sclera, conjunctiva WNL  ENT: Normal Pharynx, MMM, No tonsillar exudate/erythema  Neck: Supple, Non-tender +Cervical Collar in place  Chest: Non-tender, no rashes  Cardio: RRR, s1/s2, no appreciable murmurs/rubs/gallops  Resp: BS CTA bilaterally, no wheezing/rhonchi/rales  Abd: Soft, Non-tender, Non-distended, no rebound/guarding/rigidity  : not examined  Rectal: not examined  MSK: moving all extremities, no motor weakness, full ROM x4  Ext: palpable distal pulses, good capillary refill, no clubbing/cyanosis/edema  Psych: appropriate, cooperative  Neuro: CN II-XII grossly intact, no focal deficits  Skin: Warm/Dry. No rashes.

## 2023-09-07 NOTE — PROGRESS NOTE ADULT - SUBJECTIVE AND OBJECTIVE BOX
MARIALUISA HENDRICKSONGO    [id number]    History: 37 yo M is status post cervical disc arthroplasty POD # C4-5.  Patient is doing well and is comfortable. The patient's pain is controlled using the prescribed pain medications. The patient is participating in physical therapy. Denies nausea, vomiting, chest pain, shortness of breath, abdominal pain or fever. No new complaints.    Vital Signs Last 24 Hrs  T(C): 36.6 (07 Sep 2023 09:01), Max: 37.1 (06 Sep 2023 17:25)  T(F): 97.8 (07 Sep 2023 09:01), Max: 98.7 (06 Sep 2023 17:25)  HR: 96 (07 Sep 2023 09:01) (70 - 112)  BP: 118/71 (07 Sep 2023 09:01) (110/70 - 140/80)  BP(mean): 87 (07 Sep 2023 09:01) (71 - 102)  RR: 17 (07 Sep 2023 09:01) (14 - 20)  SpO2: 96% (07 Sep 2023 09:01) (94% - 100%)    Parameters below as of 07 Sep 2023 09:01  Patient On (Oxygen Delivery Method): room air                              11.3   9.01  )-----------( 160      ( 07 Sep 2023 05:00 )             34.1     09-07    133<L>  |  98  |  12.6  ----------------------------<  490<HH>  4.7   |  23.0  |  1.01    Ca    8.7      07 Sep 2023 05:00  Phos  3.4     09-07  Mg     1.9     09-07    TPro  6.1<L>  /  Alb  3.5  /  TBili  0.2<L>  /  DBili  x   /  AST  17  /  ALT  12  /  AlkPhos  99  09-07      MEDICATIONS  (STANDING):  acetaminophen     Tablet .. 975 milliGRAM(s) Oral every 8 hours  aspirin enteric coated 325 milliGRAM(s) Oral daily  celecoxib 200 milliGRAM(s) Oral every 12 hours  dextrose 5%. 1000 milliLiter(s) (50 mL/Hr) IV Continuous <Continuous>  dextrose 5%. 1000 milliLiter(s) (100 mL/Hr) IV Continuous <Continuous>  dextrose 50% Injectable 12.5 Gram(s) IV Push once  dextrose 50% Injectable 25 Gram(s) IV Push once  dextrose 50% Injectable 25 Gram(s) IV Push once  glucagon  Injectable 1 milliGRAM(s) IntraMuscular once  influenza   Vaccine 0.5 milliLiter(s) IntraMuscular once  insulin lispro (HumaLOG) Pump 1 Each SubCutaneous Continuous Pump  lactated ringers. 1000 milliLiter(s) (150 mL/Hr) IV Continuous <Continuous>  methocarbamol 750 milliGRAM(s) Oral every 8 hours  pantoprazole    Tablet 40 milliGRAM(s) Oral before breakfast  senna 2 Tablet(s) Oral at bedtime    MEDICATIONS  (PRN):  aluminum hydroxide/magnesium hydroxide/simethicone Suspension 30 milliLiter(s) Oral every 12 hours PRN Indigestion  benzocaine/menthol Lozenge 1 Lozenge Oral every 3 hours PRN Sore Throat  dextrose Oral Gel 15 Gram(s) Oral once PRN Blood Glucose LESS THAN 70 milliGRAM(s)/deciliter  diazepam    Tablet 2 milliGRAM(s) Oral every 8 hours PRN MUSCLE SPASM REFRACTORY TO METHOCARBAMOL  diazepam    Tablet 2 milliGRAM(s) Oral once PRN muscle spasm refractory to current medication  HYDROmorphone  Injectable 0.5 milliGRAM(s) IV Push every 10 minutes PRN Moderate Pain (4 - 6)  magnesium hydroxide Suspension 30 milliLiter(s) Oral every 12 hours PRN Constipation  melatonin 3 milliGRAM(s) Oral at bedtime PRN Insomnia  methocarbamol 750 milliGRAM(s) Oral once PRN Muscle Spasm  metoclopramide Injectable 10 milliGRAM(s) IV Push once PRN Nausea and/or Vomiting  ondansetron Injectable 4 milliGRAM(s) IV Push every 6 hours PRN Nausea and/or Vomiting  ondansetron Injectable 4 milliGRAM(s) IV Push every 6 hours PRN Nausea and/or Vomiting  oxyCODONE    IR 5 milliGRAM(s) Oral every 3 hours PRN Moderate Pain (4 - 6)  oxyCODONE    IR 10 milliGRAM(s) Oral every 3 hours PRN Severe Pain (7 - 10)      Vital Signs Last 24 Hrs  T(C): 36.6 (07 Sep 2023 09:01), Max: 37.1 (06 Sep 2023 17:25)  T(F): 97.8 (07 Sep 2023 09:01), Max: 98.7 (06 Sep 2023 17:25)  HR: 96 (07 Sep 2023 09:01) (70 - 112)  BP: 118/71 (07 Sep 2023 09:01) (110/70 - 140/80)  BP(mean): 87 (07 Sep 2023 09:01) (71 - 102)  RR: 17 (07 Sep 2023 09:01) (14 - 20)  SpO2: 96% (07 Sep 2023 09:01) (94% - 100%)    Parameters below as of 07 Sep 2023 09:01  Patient On (Oxygen Delivery Method): room air      Daily Height in cm: 177.8 (06 Sep 2023 12:47)    Daily     Appearance: Alert, responsive, in no acute distress.    Skin: no rash on visible skin. Skin is clean, dry and intact. No bleeding. No abrasions. No ulcerations.    Vascular:  No signs of venous insuffiency or stasis. No extremity ulcerations. No cyanosis.    Musculoskeletal:         Left Upper Extremity: Sensation is grossly intact to light touch. 2+ distal pulses.        Right Upper Extremity: Sensation is grossly intact to light touch. 2+ distal pulses. Cap refill < 2 sec.             Motor exam: [  ]          [ ] Upper extremity                     Bi(c5)     WE(c6)   EE(c7)   FF(c8)                                                R         5/5        5/5        5/5       5/5                                               L          5/5        5/5        5/5       5/5    Primary Orthopedic Assessment:  • 37 yo M is status post cervical disc arthroplasty POD # C4-5    Secondary  Orthopedic Assessment(s):   •     Secondary  Medical Assessment(s):   •     Plan:   • DVT prophylaxis as prescribed, including use of compression devices and ankle pumps  • Continue physical therapy  • Out of Bed as tolerated with assistance of a walker  • Incentive spirometry encouraged  • Pain control as clinically indicated  • Discharge planning – anticipated discharge is home    MARIALUISA HENDRICKSONGO    [id number]    History: 37 yo M is status post cervical disc arthroplasty POD # C4-5.  Patient is doing well and is comfortable. The patient's pain is controlled using the prescribed pain medications. The patient is participating in physical therapy. Denies nausea, vomiting, chest pain, shortness of breath, abdominal pain or fever. No new complaints. The patients BS has improved with use of patients insulin pump.    Vital Signs Last 24 Hrs  T(C): 36.6 (07 Sep 2023 09:01), Max: 37.1 (06 Sep 2023 17:25)  T(F): 97.8 (07 Sep 2023 09:01), Max: 98.7 (06 Sep 2023 17:25)  HR: 96 (07 Sep 2023 09:01) (70 - 112)  BP: 118/71 (07 Sep 2023 09:01) (110/70 - 140/80)  BP(mean): 87 (07 Sep 2023 09:01) (71 - 102)  RR: 17 (07 Sep 2023 09:01) (14 - 20)  SpO2: 96% (07 Sep 2023 09:01) (94% - 100%)    Parameters below as of 07 Sep 2023 09:01  Patient On (Oxygen Delivery Method): room air                              11.3   9.01  )-----------( 160      ( 07 Sep 2023 05:00 )             34.1     09-07    133<L>  |  98  |  12.6  ----------------------------<  490<HH>  4.7   |  23.0  |  1.01    Ca    8.7      07 Sep 2023 05:00  Phos  3.4     09-07  Mg     1.9     09-07    TPro  6.1<L>  /  Alb  3.5  /  TBili  0.2<L>  /  DBili  x   /  AST  17  /  ALT  12  /  AlkPhos  99  09-07      MEDICATIONS  (STANDING):  acetaminophen     Tablet .. 975 milliGRAM(s) Oral every 8 hours  aspirin enteric coated 325 milliGRAM(s) Oral daily  celecoxib 200 milliGRAM(s) Oral every 12 hours  dextrose 5%. 1000 milliLiter(s) (50 mL/Hr) IV Continuous <Continuous>  dextrose 5%. 1000 milliLiter(s) (100 mL/Hr) IV Continuous <Continuous>  dextrose 50% Injectable 12.5 Gram(s) IV Push once  dextrose 50% Injectable 25 Gram(s) IV Push once  dextrose 50% Injectable 25 Gram(s) IV Push once  glucagon  Injectable 1 milliGRAM(s) IntraMuscular once  influenza   Vaccine 0.5 milliLiter(s) IntraMuscular once  insulin lispro (HumaLOG) Pump 1 Each SubCutaneous Continuous Pump  lactated ringers. 1000 milliLiter(s) (150 mL/Hr) IV Continuous <Continuous>  methocarbamol 750 milliGRAM(s) Oral every 8 hours  pantoprazole    Tablet 40 milliGRAM(s) Oral before breakfast  senna 2 Tablet(s) Oral at bedtime    MEDICATIONS  (PRN):  aluminum hydroxide/magnesium hydroxide/simethicone Suspension 30 milliLiter(s) Oral every 12 hours PRN Indigestion  benzocaine/menthol Lozenge 1 Lozenge Oral every 3 hours PRN Sore Throat  dextrose Oral Gel 15 Gram(s) Oral once PRN Blood Glucose LESS THAN 70 milliGRAM(s)/deciliter  diazepam    Tablet 2 milliGRAM(s) Oral every 8 hours PRN MUSCLE SPASM REFRACTORY TO METHOCARBAMOL  diazepam    Tablet 2 milliGRAM(s) Oral once PRN muscle spasm refractory to current medication  HYDROmorphone  Injectable 0.5 milliGRAM(s) IV Push every 10 minutes PRN Moderate Pain (4 - 6)  magnesium hydroxide Suspension 30 milliLiter(s) Oral every 12 hours PRN Constipation  melatonin 3 milliGRAM(s) Oral at bedtime PRN Insomnia  methocarbamol 750 milliGRAM(s) Oral once PRN Muscle Spasm  metoclopramide Injectable 10 milliGRAM(s) IV Push once PRN Nausea and/or Vomiting  ondansetron Injectable 4 milliGRAM(s) IV Push every 6 hours PRN Nausea and/or Vomiting  ondansetron Injectable 4 milliGRAM(s) IV Push every 6 hours PRN Nausea and/or Vomiting  oxyCODONE    IR 5 milliGRAM(s) Oral every 3 hours PRN Moderate Pain (4 - 6)  oxyCODONE    IR 10 milliGRAM(s) Oral every 3 hours PRN Severe Pain (7 - 10)      Vital Signs Last 24 Hrs  T(C): 36.6 (07 Sep 2023 09:01), Max: 37.1 (06 Sep 2023 17:25)  T(F): 97.8 (07 Sep 2023 09:01), Max: 98.7 (06 Sep 2023 17:25)  HR: 96 (07 Sep 2023 09:01) (70 - 112)  BP: 118/71 (07 Sep 2023 09:01) (110/70 - 140/80)  BP(mean): 87 (07 Sep 2023 09:01) (71 - 102)  RR: 17 (07 Sep 2023 09:01) (14 - 20)  SpO2: 96% (07 Sep 2023 09:01) (94% - 100%)    Parameters below as of 07 Sep 2023 09:01  Patient On (Oxygen Delivery Method): room air      Daily Height in cm: 177.8 (06 Sep 2023 12:47)    Daily     Appearance: Alert, responsive, in no acute distress.    Skin: no rash on visible skin. Skin is clean, dry and intact. No bleeding. No abrasions. No ulcerations.    Musculoskeletal:         Left Upper Extremity: Sensation is grossly intact to light touch. 2+ distal pulses.        Right Upper Extremity: Sensation is grossly intact to light touch. 2+ distal pulses. Cap refill < 2 sec.             Motor exam: [  ]          [ ] Upper extremity                     Bi(c5)     WE(c6)   EE(c7)   FF(c8)                                                R         5/5        5/5        5/5       5/5                                               L          5/5        5/5        5/5       5/5    Primary Orthopedic Assessment:  • 37 yo M is status post cervical disc arthroplasty POD # C4-5    Secondary  Orthopedic Assessment(s):   •     Secondary  Medical Assessment(s):   •     Plan:   • DVT prophylaxis as prescribed, including use of compression devices and ankle pumps  • Continue physical therapy  • Out of Bed as tolerated with assistance of a walker  • Incentive spirometry encouraged  • Pain control as clinically indicated  • Discharge planning – anticipated discharge is home today

## 2023-09-07 NOTE — OCCUPATIONAL THERAPY INITIAL EVALUATION ADULT - LIVES WITH, PROFILE
Pt lives in a 2 level home, 3 JAMES with 10 steps to bed.bathroom, however can stay on main level/spouse

## 2023-09-07 NOTE — DISCHARGE NOTE NURSING/CASE MANAGEMENT/SOCIAL WORK - NSDCPEFALRISK_GEN_ALL_CORE
For information on Fall & Injury Prevention, visit: [email or web address] OR  [email or web address] OR  https://www.cdc.gov/steadi/patient.html

## 2023-09-07 NOTE — PHYSICAL THERAPY INITIAL EVALUATION ADULT - PERTINENT HX OF CURRENT PROBLEM, REHAB EVAL
Pt is 38M with PMHX IDDM1 on Insulin Pump, Cervical Radiculopathy admitted to Orthopedic Surgery on 9/6/23 now s/p Cervical Disk Arthroplasty C4-C5.

## 2023-09-07 NOTE — CONSULT NOTE ADULT - ASSESSMENT
ASSESSMENT:  38M with PMHX IDDM1 on Insulin Pump, Cervical Radiculopathy admitted to Orthopedic Surgery on 9/6/23 now s/p Cervical Disk Arthroplasty C4-C5 with uncontrolled hyperglycemia in setting of IDDM1 on Insulin Pump.    PLAN:  IDDM1 on Insulin Pump with uncontrolled hyperglycemia r/o impending DKA/HHS  -Upgrade patient to Telemetry Floor  -Patient gave himself 11.5 units Humalog approx 1 hour ago  -Resistant to additional insulin due to hx prior hypoglycemic episodes  -Stat CBC/CMP/Mg/Phos/VBG/Serum Ketones/BHB  -Check UA  -1L IVFB NSS x1 now  -IVF LR 150cc/hr thereafter  -Additional IVFB and Insulin mgmt pending lab results  -NPO  -Endo Consult for AM  -If stat labs show significant anion gap metabolic acidosis/serum ketonuria concerning for DKA/HHS will obtain MICU consult for possible Insulin gtt  -Plan discussed with patient, RN, and Ortho PA    Cervical Radiculopathy s/p C4-C5 Arthroplasty  -Pain Meds PRN  -Management per Ortho Sx    Will continue to follow with Hospitalist consult service. ASSESSMENT:  38M with PMHX IDDM1 on Insulin Pump, Cervical Radiculopathy admitted to Orthopedic Surgery on 9/6/23 now s/p Cervical Disk Arthroplasty C4-C5 with uncontrolled hyperglycemia in setting of IDDM1 on Insulin Pump.    PLAN:  IDDM1 on Insulin Pump with uncontrolled hyperglycemia r/o impending DKA/HHS  -Upgrade patient to Telemetry Floor  -Patient gave himself 11.5 units Humalog approx 1 hour ago  -Patient apprehensive about getting additional insulin due to hx prior hypoglycemic episodes  -Stat CBC/CMP/Mg/Phos/VBG/Serum Ketones/BHB  -Check UA  -1L IVFB NSS x1 now  -IVF LR 150cc/hr thereafter  -Additional IVFB and Insulin mgmt pending lab results  -NPO  -Accucheck q4  -Change ISS to q4 while NPO moderate scale   -Endo Consult for AM  -If stat labs show significant anion gap metabolic acidosis/serum ketonuria concerning for DKA/HHS will obtain MICU consult for possible Insulin gtt  -Plan discussed with patient, RN, and Ortho PA    Cervical Radiculopathy s/p C4-C5 Arthroplasty  -Pain Meds PRN  -Management per Ortho Sx    Will continue to follow with Hospitalist consult service. ASSESSMENT:  38M with PMHX IDDM1 on Insulin Pump, Cervical Radiculopathy admitted to Orthopedic Surgery on 9/6/23 now s/p Cervical Disk Arthroplasty C4-C5 with uncontrolled hyperglycemia in setting of IDDM1 on Insulin Pump.    PLAN:  IDDM1 on Insulin Pump with uncontrolled hyperglycemia r/o impending DKA/HHS  -Upgrade patient to Telemetry Floor  -Patient gave himself 11.5 units Humalog approx 1 hour ago  -Patient apprehensive about getting additional insulin due to hx prior hypoglycemic episodes  -Stat CBC/CMP/Mg/Phos/VBG/Serum Ketones/BHB  -Check UA  -1L IVFB NSS x1 now  -IVF LR 150cc/hr thereafter  -Additional IVFB and Insulin mgmt pending lab results  -NPO  -Accucheck q4  -Change ISS to q4 while NPO moderate scale   -Endo Consult for AM  -If stat labs show significant anion gap metabolic acidosis/serum ketonuria concerning for DKA/HHS will obtain MICU consult for possible Insulin gtt  -Plan discussed with patient, RN, and Ortho PA    Cervical Radiculopathy s/p C4-C5 Arthroplasty  -Pain Meds PRN  -Management per Ortho Sx    ADDENDUM:  Labs reviewed.   UA +Glucose 1000. No ketonuria  Serum Acetone negative  VBG 7.37 normal pH   Lactate 2.6 noted)  Glucose 548 (prior 646)  BMP/Mg/Phos still pending  1L IVFN NSS x1 given  Give Additional 1L IVFB NSS x1  IVF LR 150cc/hr thereafter  Accucheck q4 with ISS q4   Monitor Closely    Will continue to follow with Hospitalist consult service.

## 2023-09-07 NOTE — DISCHARGE NOTE NURSING/CASE MANAGEMENT/SOCIAL WORK - PATIENT PORTAL LINK FT
You can access the FollowMyHealth Patient Portal offered by [facility] by registering at the following website: [email or web address]. By joining [facility]’s FollowMyHealth portal, you will also be able to view your health information using other applications (apps) compatible with our system.

## 2023-09-07 NOTE — PROGRESS NOTE ADULT - SUBJECTIVE AND OBJECTIVE BOX
Called by RN regarding patients HR. Patient tachycardic since surgery. In pacu with HR over 90 and on floor with -110. Patient without complaints. Denies SOB/CP. Resting comfortably in bed. HR continues to be elevated. EKG/Labs ordered. BMP shows glucose of 646. FS >530. Patient has insulin pump that has not been turned off. Hospitalist consulted for evaluation. Awaiting further plan.  Called by RN regarding patients HR. Patient tachycardic since surgery. In pacu with HR over 90 and on floor with -110. Patient without complaints. Denies SOB/CP.  HR continues to be elevated. EKG/Labs ordered. BMP shows glucose of 646. FS >530. Patient has insulin pump that has not been turned off. Hospitalist consulted for evaluation. Awaiting further plan.

## 2023-09-07 NOTE — CONSULT NOTE ADULT - SUBJECTIVE AND OBJECTIVE BOX
Patient is a 38y old  Male who presents with a chief complaint of Cervical Radiculopathy s/p C4-C5 Arthroplasty  HPI:  38M with PMHX IDDM1 on Insulin Pump, Cervical Radiculopathy admitted to Orthopedic Surgery on 9/6/23 now s/p Cervical Disk Arthroplasty C4-C5.   He has h/o DM type 1, on inuslin pump.  Endo consulted for same.  Patient noted to have elevated FS BS overnight by Orthopedic Surgery.  FS BS >530 x2. Most recent BMP Glucose 646 with developing metabolic acidosis.    Pt admits to snacking post-operatively during the day and not using his insulin pump with mealtime coverage as he should.   C/o post-op pain otherwise denies abd pain, n/v/d, HA, or any other complaints. Patient reports bolusing himself 11.5 units .   He is very concerned about overtreating his diabetes with insulin and becoming hypogylcemic.     PAST MEDICAL & SURGICAL HISTORY:  Diabetes mellitus type 1    Other cervical disc displacement, unspecified cervical region    Right fibular fracture    S/P ORIF (open reduction internal fixation) fracture        Social History:  non smoker   social alcohol use      FAMILY HISTORY:  FH: type 1 diabetes mellitus (Father)          Allergies    No Known Allergies    Intolerances        REVIEW OF SYSTEMS:    CONSTITUTIONAL: No fever, weight loss, or fatigue  EYES: No eye pain, visual disturbances, or discharge  ENMT:  No difficulty hearing, tinnitus, vertigo; No sinus or throat pain  NECK: No pain or stiffness  RESPIRATORY: No cough, wheezing, chills or hemoptysis; No shortness of breath  CARDIOVASCULAR: No chest pain, palpitations, dizziness, or leg swelling  GASTROINTESTINAL: No abdominal or epigastric pain. No nausea, vomiting, or hematemesis  NEUROLOGICAL: No headaches, memory loss, loss of strength, numbness, or tremors  SKIN: No itching, burning, rashes, or lesions   MUSCULOSKELETAL: No joint pain or swelling; No muscle, back, or extremity pain  PSYCHIATRIC: No depression, anxiety, mood swings, or difficulty sleeping        MEDICATIONS  (STANDING):  acetaminophen     Tablet .. 975 milliGRAM(s) Oral every 8 hours  aspirin enteric coated 325 milliGRAM(s) Oral daily  celecoxib 200 milliGRAM(s) Oral every 12 hours  dextrose 5%. 1000 milliLiter(s) (50 mL/Hr) IV Continuous <Continuous>  dextrose 5%. 1000 milliLiter(s) (100 mL/Hr) IV Continuous <Continuous>  dextrose 50% Injectable 25 Gram(s) IV Push once  dextrose 50% Injectable 12.5 Gram(s) IV Push once  dextrose 50% Injectable 25 Gram(s) IV Push once  glucagon  Injectable 1 milliGRAM(s) IntraMuscular once  influenza   Vaccine 0.5 milliLiter(s) IntraMuscular once  insulin lispro (HumaLOG) Pump 1 Each SubCutaneous Continuous Pump  lactated ringers. 1000 milliLiter(s) (150 mL/Hr) IV Continuous <Continuous>  methocarbamol 750 milliGRAM(s) Oral every 8 hours  pantoprazole    Tablet 40 milliGRAM(s) Oral before breakfast  senna 2 Tablet(s) Oral at bedtime    MEDICATIONS  (PRN):  aluminum hydroxide/magnesium hydroxide/simethicone Suspension 30 milliLiter(s) Oral every 12 hours PRN Indigestion  benzocaine/menthol Lozenge 1 Lozenge Oral every 3 hours PRN Sore Throat  dextrose Oral Gel 15 Gram(s) Oral once PRN Blood Glucose LESS THAN 70 milliGRAM(s)/deciliter  diazepam    Tablet 2 milliGRAM(s) Oral every 8 hours PRN MUSCLE SPASM REFRACTORY TO METHOCARBAMOL  diazepam    Tablet 2 milliGRAM(s) Oral once PRN muscle spasm refractory to current medication  HYDROmorphone  Injectable 0.5 milliGRAM(s) IV Push every 10 minutes PRN Moderate Pain (4 - 6)  magnesium hydroxide Suspension 30 milliLiter(s) Oral every 12 hours PRN Constipation  melatonin 3 milliGRAM(s) Oral at bedtime PRN Insomnia  methocarbamol 750 milliGRAM(s) Oral once PRN Muscle Spasm  metoclopramide Injectable 10 milliGRAM(s) IV Push once PRN Nausea and/or Vomiting  ondansetron Injectable 4 milliGRAM(s) IV Push every 6 hours PRN Nausea and/or Vomiting  ondansetron Injectable 4 milliGRAM(s) IV Push every 6 hours PRN Nausea and/or Vomiting  oxyCODONE    IR 5 milliGRAM(s) Oral every 3 hours PRN Moderate Pain (4 - 6)  oxyCODONE    IR 10 milliGRAM(s) Oral every 3 hours PRN Severe Pain (7 - 10)      Vital Signs Last 24 Hrs  T(C): 36.6 (07 Sep 2023 09:01), Max: 37.1 (06 Sep 2023 17:25)  T(F): 97.8 (07 Sep 2023 09:01), Max: 98.7 (06 Sep 2023 17:25)  HR: 96 (07 Sep 2023 09:01) (70 - 112)  BP: 118/71 (07 Sep 2023 09:01) (110/70 - 140/80)  BP(mean): 87 (07 Sep 2023 09:01) (71 - 102)  RR: 17 (07 Sep 2023 09:01) (14 - 20)  SpO2: 96% (07 Sep 2023 09:01) (94% - 100%)    Parameters below as of 07 Sep 2023 09:01  Patient On (Oxygen Delivery Method): room air          PHYSICAL EXAM:    Constitutional: NAD, well-groomed, well-developed  HEENT: PERRL, no exophalmos  Neck: No LAD,  no thyroid enlargement  Respiratory: CTAB  Cardiovascular: S1 and S2, RRR, no M/G/R  Gastrointestinal: BS+, soft, no organomegaly or mass  Extremities: No peripheral edema, no pedal lesions  Neurological: A/O x 3, no focal deficits  Psychiatric: Normal mood, normal affect  Skin: No rashes, no acanthosis        LABS  09-07    133<L>  |  98  |  12.6  ----------------------------<  490<HH>  4.7   |  23.0  |  1.01    Ca    8.7      07 Sep 2023 05:00  Phos  3.4     09-07  Mg     1.9     09-07    TPro  6.1<L>  /  Alb  3.5  /  TBili  0.2<L>  /  DBili  x   /  AST  17  /  ALT  12  /  AlkPhos  99  09-07                          11.3   9.01  )-----------( 160      ( 07 Sep 2023 05:00 )             34.1       A1C with Estimated Average Glucose Result: 8.4 % (08-23-23 @ 15:50)        Ketone - Urine: Negative (09-07 @ 04:05)    Aspartate Aminotransferase (AST/SGOT): 17 U/L (09-07-23 @ 05:00)  Alanine Aminotransferase (ALT/SGPT): 12 U/L (09-07-23 @ 05:00)  Alkaline Phosphatase: 99 U/L (09-07-23 @ 05:00)  Albumin: 3.5 g/dL (09-07-23 @ 05:00)      CAPILLARY BLOOD GLUCOSE      POCT Blood Glucose.: 310 mg/dL (07 Sep 2023 10:49)  POCT Blood Glucose.: 370 mg/dL (07 Sep 2023 06:21)  POCT Blood Glucose.: >530 mg/dL (07 Sep 2023 03:26)  POCT Blood Glucose.: >530 mg/dL (07 Sep 2023 03:24)  POCT Blood Glucose.: 377 mg/dL (06 Sep 2023 23:18)  POCT Blood Glucose.: 180 mg/dL (06 Sep 2023 17:21)  POCT Blood Glucose.: 120 mg/dL (06 Sep 2023 12:58)

## 2023-09-07 NOTE — CHART NOTE - NSCHARTNOTEFT_GEN_A_CORE
Nikki was asked to consult for diabetes management on this patient, I went to see him this morning.  He is on omnipod pump, he was very rude from the start, refused to be seen, did not want to answer any questions. He said   the reason he was high yesterday was that he ate without bolusing insulin, he knows how to manage his diabetes and he is not here for that so he will not talk to us.  He refused to show his pump settings, and said if we asked him more questions we would leave AMA.   So I left the room, discussed with primary team that we will not be able to help with his diabetes management as patient refuses to.

## 2023-09-07 NOTE — CONSULT NOTE ADULT - ASSESSMENT
38M with PMHX IDDM1 on Insulin Pump, Cervical Radiculopathy admitted to Orthopedic Surgery on 9/6/23 now s/p Cervical Disk Arthroplasty   C4-C5 with uncontrolled hyperglycemia in setting of IDDM1 on Insulin Pump.    PLAN:  IDDM1 on Insulin Pump with uncontrolled hyperglycemia   -sugars still high in 300s, patient worried about lows.  -Will recommend to continue him on his inusloin pump.  -check fingerstick ac tid hs.  -diabetic diet.      Cervical Radiculopathy s/p C4-C5 Arthroplasty  -Pain Meds PRN  -Management per Ortho Sx

## 2023-09-07 NOTE — OCCUPATIONAL THERAPY INITIAL EVALUATION ADULT - PERTINENT HX OF CURRENT PROBLEM, REHAB EVAL
As per MD note: 37 yo M is status post cervical disc arthroplasty POD # C4-5.  Patient is doing well and is comfortable. The patient's pain is controlled using the prescribed pain medications. The patient is participating in physical therapy. Denies nausea, vomiting, chest pain, shortness of breath, abdominal pain or fever. No new complaints. The patients BS has improved with use of patients insulin pump.

## 2023-09-14 ENCOUNTER — APPOINTMENT (OUTPATIENT)
Dept: ORTHOPEDIC SURGERY | Facility: CLINIC | Age: 38
End: 2023-09-14
Payer: MEDICAID

## 2023-09-14 PROCEDURE — 72050 X-RAY EXAM NECK SPINE 4/5VWS: CPT

## 2023-09-14 PROCEDURE — 99024 POSTOP FOLLOW-UP VISIT: CPT

## 2023-09-14 RX ORDER — METHOCARBAMOL 750 MG/1
750 TABLET, FILM COATED ORAL
Qty: 21 | Refills: 0 | Status: ACTIVE | COMMUNITY
Start: 2023-09-14 | End: 1900-01-01

## 2023-09-16 ENCOUNTER — NON-APPOINTMENT (OUTPATIENT)
Age: 38
End: 2023-09-16

## 2023-10-05 ENCOUNTER — APPOINTMENT (OUTPATIENT)
Dept: ORTHOPEDIC SURGERY | Facility: CLINIC | Age: 38
End: 2023-10-05
Payer: MEDICAID

## 2023-10-05 PROCEDURE — 72050 X-RAY EXAM NECK SPINE 4/5VWS: CPT

## 2023-10-05 PROCEDURE — 99024 POSTOP FOLLOW-UP VISIT: CPT

## 2023-10-05 RX ORDER — OXYCODONE 5 MG/1
5 TABLET ORAL EVERY 4 HOURS
Qty: 30 | Refills: 0 | Status: ACTIVE | COMMUNITY
Start: 2023-09-14 | End: 1900-01-01

## 2023-10-06 RX ORDER — TRAMADOL HYDROCHLORIDE 50 MG/1
50 TABLET, COATED ORAL
Qty: 21 | Refills: 1 | Status: ACTIVE | COMMUNITY
Start: 2023-10-05 | End: 1900-01-01

## 2023-11-17 ENCOUNTER — APPOINTMENT (OUTPATIENT)
Dept: ORTHOPEDIC SURGERY | Facility: CLINIC | Age: 38
End: 2023-11-17
Payer: MEDICAID

## 2023-11-17 VITALS
DIASTOLIC BLOOD PRESSURE: 80 MMHG | SYSTOLIC BLOOD PRESSURE: 126 MMHG | HEIGHT: 70 IN | BODY MASS INDEX: 27.2 KG/M2 | WEIGHT: 190 LBS | HEART RATE: 103 BPM

## 2023-11-17 DIAGNOSIS — Z98.890 OTHER SPECIFIED POSTPROCEDURAL STATES: ICD-10-CM

## 2023-11-17 PROCEDURE — 72050 X-RAY EXAM NECK SPINE 4/5VWS: CPT

## 2023-11-17 PROCEDURE — 99024 POSTOP FOLLOW-UP VISIT: CPT

## 2023-11-17 RX ORDER — NAPROXEN 500 MG/1
500 TABLET ORAL
Qty: 30 | Refills: 0 | Status: ACTIVE | COMMUNITY
Start: 2023-11-17 | End: 1900-01-01

## 2023-12-04 NOTE — CONSULT NOTE ADULT - CONSULT REQUESTED DATE/TIME
07-Sep-2023 05:21 12/04/23  Screened by: Jaylan Larios    Referring Provider     Pre- Screening: There is no height or weight on file to calculate BMI. Has patient been referred for a routine screening Colonoscopy? yes  Is the patient between 43-73 years old? yes      Previous Colonoscopy no   If yes:    Date: N/A    Facility: N/A    Reason: N/A      SCHEDULING STAFF: If the patient is between 45yrs-49yrs, please advise patient to confirm benefits/coverage with their insurance company for a routine screening colonoscopy, some insurance carriers will only cover at [facility] or [facility]. If the patient is over 66years old, please schedule an office visit. Does the patient want to see a Gastroenterologist prior to their procedure OR are they having any GI symptoms? no    Has the patient been hospitalized or had abdominal surgery in the past 6 months? no    Does the patient use supplemental oxygen? no    Does the patient take Coumadin, Lovenox, Plavix, Elliquis, Xarelto, or other blood thinning medication? no    Has the patient had a stroke, cardiac event, or stent placed in the past year? no    SCHEDULING STAFF: If patient answers NO to above questions, then schedule procedure. If patient answers YES to above questions, then schedule office appointment. If patient is between 45yrs - 49yrs, please advise patient that we will have to confirm benefits & coverage with their insurance company for a routine screening colonoscopy.

## 2023-12-10 ENCOUNTER — NON-APPOINTMENT (OUTPATIENT)
Age: 38
End: 2023-12-10

## 2024-01-18 ENCOUNTER — APPOINTMENT (OUTPATIENT)
Dept: ORTHOPEDIC SURGERY | Facility: CLINIC | Age: 39
End: 2024-01-18

## 2024-03-26 NOTE — ASU PREOP CHECKLIST - WAS PATIENT ON BETA BLOCKER?
Additional Notes: Advised on the importance of short showers and moisturizing daily
Render Risk Assessment In Note?: no
Detail Level: Simple
No

## 2024-05-19 ENCOUNTER — EMERGENCY (EMERGENCY)
Facility: HOSPITAL | Age: 39
LOS: 1 days | Discharge: HOME | End: 2024-05-19
Attending: STUDENT IN AN ORGANIZED HEALTH CARE EDUCATION/TRAINING PROGRAM
Payer: MEDICAID

## 2024-05-19 VITALS
RESPIRATION RATE: 20 BRPM | TEMPERATURE: 98 F | SYSTOLIC BLOOD PRESSURE: 136 MMHG | DIASTOLIC BLOOD PRESSURE: 88 MMHG | HEART RATE: 98 BPM | WEIGHT: 204.37 LBS | OXYGEN SATURATION: 98 % | HEIGHT: 70 IN

## 2024-05-19 DIAGNOSIS — Z98.890 OTHER SPECIFIED POSTPROCEDURAL STATES: Chronic | ICD-10-CM

## 2024-05-19 PROCEDURE — 93005 ELECTROCARDIOGRAM TRACING: CPT

## 2024-05-19 PROCEDURE — 99283 EMERGENCY DEPT VISIT LOW MDM: CPT | Mod: 25

## 2024-05-19 PROCEDURE — 71101 X-RAY EXAM UNILAT RIBS/CHEST: CPT | Mod: 26

## 2024-05-19 PROCEDURE — 93010 ELECTROCARDIOGRAM REPORT: CPT

## 2024-05-19 PROCEDURE — 71046 X-RAY EXAM CHEST 2 VIEWS: CPT

## 2024-05-19 PROCEDURE — 99284 EMERGENCY DEPT VISIT MOD MDM: CPT

## 2024-05-19 PROCEDURE — 71101 X-RAY EXAM UNILAT RIBS/CHEST: CPT

## 2024-05-19 RX ORDER — METHOCARBAMOL 500 MG/1
1000 TABLET, FILM COATED ORAL ONCE
Refills: 0 | Status: COMPLETED | OUTPATIENT
Start: 2024-05-19 | End: 2024-05-19

## 2024-05-19 RX ORDER — IBUPROFEN 200 MG
1 TABLET ORAL
Qty: 18 | Refills: 0
Start: 2024-05-19 | End: 2024-05-24

## 2024-05-19 RX ORDER — LIDOCAINE 4 G/100G
1 CREAM TOPICAL ONCE
Refills: 0 | Status: COMPLETED | OUTPATIENT
Start: 2024-05-19 | End: 2024-05-19

## 2024-05-19 RX ORDER — LIDOCAINE 4 G/100G
1 CREAM TOPICAL
Qty: 7 | Refills: 0
Start: 2024-05-19 | End: 2024-05-25

## 2024-05-19 RX ORDER — KETOROLAC TROMETHAMINE 30 MG/ML
30 SYRINGE (ML) INJECTION ONCE
Refills: 0 | Status: DISCONTINUED | OUTPATIENT
Start: 2024-05-19 | End: 2024-05-19

## 2024-05-19 RX ORDER — METHOCARBAMOL 500 MG/1
2 TABLET, FILM COATED ORAL
Qty: 20 | Refills: 0
Start: 2024-05-19 | End: 2024-05-23

## 2024-05-19 RX ADMIN — METHOCARBAMOL 1000 MILLIGRAM(S): 500 TABLET, FILM COATED ORAL at 11:06

## 2024-05-19 RX ADMIN — Medication 30 MILLIGRAM(S): at 11:05

## 2024-05-19 RX ADMIN — LIDOCAINE 1 PATCH: 4 CREAM TOPICAL at 11:06
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
## 2024-05-19 NOTE — ED PROVIDER NOTE - NSFOLLOWUPINSTRUCTIONS_ED_ALL_ED_FT
Please take all medications as prescribed  Do NOT drive and do NOT operate any heavy machinery while taking robaxin as it might cause drowsiness.  1)Follow up with your PCP in 1 week  Return to the emergency room if you are experiencing any new or worsening symptoms    Chest Pain    Chest pain can be caused by many different conditions which may or may not be dangerous. Causes include heartburn, lung infections, heart attack, blood clot in lungs, skin infections, strain or damage to muscle, cartilage, or bones, etc. In addition to a history and physical examination, an electrocardiogram (ECG) or other lab tests may have been performed to determine the cause of your chest pain. Follow up with your primary care provider or with a cardiologist as instructed.     SEEK IMMEDIATE MEDICAL CARE IF YOU HAVE ANY OF THE FOLLOWING SYMPTOMS: worsening chest pain, coughing up blood, unexplained back/neck/jaw pain, severe abdominal pain, dizziness or lightheadedness, fainting, shortness of breath, sweaty or clammy skin, vomiting, or racing heart beat. These symptoms may represent a serious problem that is an emergency. Do not wait to see if the symptoms will go away. Get medical help right away. Call 911 and do not drive yourself to the hospital.
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
## 2024-05-19 NOTE — ED PROVIDER NOTE - NSFOLLOWUPCLINICS_GEN_ALL_ED_FT
[facility]  Cardiology  [address] [place] [address]  Phone: [phone]  Fax:   Follow Up Time: 7-10 Days

## 2024-05-19 NOTE — ED ADULT TRIAGE NOTE - GLASGOW COMA SCALE: SCORE, MLM
Health Maintenance Due   Topic Date Due   • DTaP/Tdap/Td Vaccine (1 - Tdap) 08/10/1991   • Shingles Vaccine (1 of 2) Never done   • Medicare Wellness Visit  03/19/2020   • Influenza Vaccine (1) 09/01/2021     Not clinically indicated at this time    15

## 2024-05-19 NOTE — ED ADULT NURSE NOTE - NSFALLUNIVINTERV_ED_ALL_ED
Bed/Stretcher in lowest position, wheels locked, appropriate side rails in place/Call bell, personal items and telephone in reach/Instruct patient to call for assistance before getting out of bed/chair/stretcher/Non-slip footwear applied when patient is off stretcher/[place] to call system/Physically safe environment - no spills, clutter or unnecessary equipment/Purposeful proactive rounding/Room/bathroom lighting operational, light cord in reach
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
## 2024-05-19 NOTE — ED PROVIDER NOTE - PHYSICAL EXAMINATION
Constitutional: Awake, alert and oriented. In no acute distress. Well appearing.  HEENT: NC/AT. Moist mucous membranes.  Eyes: No scleral icterus. EOMI.  Neck:. Soft and supple. Full ROM without pain.  Cardiac: Regular rate and regular rhythm. +S1/S2. Peripheral pulses 2+ and symmetric. No LE edema.  Respiratory: Speaking in full sentences. No evidence of respiratory distress. No wheezes, rales or rhonchi.  Abdomen: Soft, non-distended and non tender Normal bowel sounds in all 4 quadrants. No guarding or rebound. no CVAT  Back: Spine midline and non-tender.   Skin: No rashes, abrasions or lacerations.  Lymph: No cervical lymphadenopathy.  MSK: (+) reproducible Rt chest wall tenderness. No ecchymosis or rash.  No calf tenderness bilaterally. No pitting edema bilaterally.   Neuro: Awake, alert & oriented x 3. Moves all extremities symmetrically. strength 5/5 all upper and lower extremities, sensation to light touch intact throughout upper/ lower extremities and face.  Psych: calm, cooperative, normal affect

## 2024-05-19 NOTE — ED PROVIDER NOTE - PATIENT PORTAL LINK FT
You can access the FollowMyHealth Patient Portal offered by [facility] by registering at the following website: [email or web address]. By joining [facility]’s FollowMyHealth portal, you will also be able to view your health information using other applications (apps) compatible with our system.

## 2024-05-23 DIAGNOSIS — E11.40 TYPE 2 DIABETES MELLITUS WITH DIABETIC NEUROPATHY, UNSPECIFIED: ICD-10-CM
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
## 2024-05-23 DIAGNOSIS — Z79.82 LONG TERM (CURRENT) USE OF ASPIRIN: ICD-10-CM

## 2024-05-23 DIAGNOSIS — R07.89 OTHER CHEST PAIN: ICD-10-CM
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
## 2024-05-23 DIAGNOSIS — R07.81 PLEURODYNIA: ICD-10-CM

## 2024-06-17 NOTE — ED ADULT NURSE NOTE - NSIMPLEMENTINTERV_GEN_ALL_ED
Information & Instructions   After Finger, Hand, Wrist, or Elbow Injection    Yvan Santacruz MD    You have received an injection of local anesthetic (Bupivicaine without Epinephrine) for comfort & a steroid (Kenalog) for it’s strong anti-inflammatory effects. In order to give the medication a chance to reduce your inflammation and discomfort, it is recommended that you take it easy for a day or so.  You may use your hand and arm as you feel comfortable, but you should avoid highly strenuous activity and heavy use for several days.    Relief from the injection will often not begin for several days, and you may not feel full relief for up to one month.      It is not uncommon to experience some local discomfort or pain at or around the injection site for a few days.  To relieve these symptoms you may do the following if you feel necessary:       Apply ice to the affected area 20 minutes on and 20 minutes off.   Do not apply ice directly to the skin. Use a thin layer (T-shirt, pillowcase, towel, etc.) to protect the skin.     - If allowed by your other medical physicians, you may take -     2 Tylenol extra strength tablets every 4-6 hours       1-2 Aleve tablets twice a day     2-3 Advil tablets two to three times a day    If you are diabetic, the steroid medication may increase your blood sugar, so you are advised to monitor your sugar more closely so you can adjust it accordingly for a few days following your injection.  If you need assistance with the control of you blood sugar, please contact you primary care physician for further advice.    I will request that you please call the office one month after your injection at [phone] if you have not experienced relief of your symptoms (unless I have instructed you otherwise).  If your injection has given you good relief of you symptoms as expected, then you only need to call the office if your symptoms return.     Implemented All Universal Safety Interventions:  [place] to call system. Call bell, personal items and telephone within reach. Instruct patient to call for assistance. Room bathroom lighting operational. Non-slip footwear when patient is off stretcher. Physically safe environment: no spills, clutter or unnecessary equipment. Stretcher in lowest position, wheels locked, appropriate side rails in place.

## (undated) DEVICE — GLV 6.5 PROTEXIS (BLUE)

## (undated) DEVICE — SOL IRR POUR H2O 1000ML

## (undated) DEVICE — STRYKER SONOPET IQ TIP 11CM APEX KNIFE

## (undated) DEVICE — GLV 8 PROTEXIS (WHITE)

## (undated) DEVICE — DRAPE INSTRUMENT POUCH 6.75" X 11"

## (undated) DEVICE — DRAIN JACKSON PRATT 7FR ROUND END NO TROCAR

## (undated) DEVICE — TUBING FOR SMOKE EVACUATOR (PURPLE END)

## (undated) DEVICE — DRSG DERMABOND 0.7ML

## (undated) DEVICE — VENODYNE/SCD SLEEVE CALF MEDIUM

## (undated) DEVICE — DRAPE C ARM UNIVERSAL

## (undated) DEVICE — SUT MONOCRYL 3-0 27" PS-2 UNDYED

## (undated) DEVICE — GLV 8 PROTEXIS (BLUE)

## (undated) DEVICE — MARKING PEN W RULER

## (undated) DEVICE — SUT NYLON 3-0 18" PS-2

## (undated) DEVICE — DRAPE TOWEL BLUE 17" X 24"

## (undated) DEVICE — DRAPE TOWEL 1000 SMALL 17" X 11"

## (undated) DEVICE — PREP DURAPREP 26CC

## (undated) DEVICE — NDL SPINAL 18G X 3.5" (PINK)

## (undated) DEVICE — FOLEY TRAY 16FR LF URINE METER SURESTEP

## (undated) DEVICE — WARMING BLANKET FULL ADULT

## (undated) DEVICE — MIDAS REX CLEARVIEW IRRIGATION TUBING SET

## (undated) DEVICE — SPONGE PEANUT AUTO COUNT

## (undated) DEVICE — SUT VICRYL 2-0 18" CP-2 UNDYED (POP-OFF)

## (undated) DEVICE — ELCTR BOVIE PENCIL BLADE 10FT

## (undated) DEVICE — SOL IRR POUR NS 0.9% 1000ML

## (undated) DEVICE — STRYKER SONOPET IQ TIP 12CM CLAW

## (undated) DEVICE — DRAPE 3/4 SHEET 52X76"

## (undated) DEVICE — STRYKER SONOPET IQ TUBING SET

## (undated) DEVICE — DRAPE SPLIT SHEET 77" X 108"

## (undated) DEVICE — SUT VICRYL 3-0 18" SH UNDYED (POP-OFF)

## (undated) DEVICE — DRAPE LARGE SHEET 72X85"

## (undated) DEVICE — GLV 6.5 PROTEXIS (WHITE)